# Patient Record
Sex: FEMALE | Race: WHITE | NOT HISPANIC OR LATINO | Employment: FULL TIME | URBAN - METROPOLITAN AREA
[De-identification: names, ages, dates, MRNs, and addresses within clinical notes are randomized per-mention and may not be internally consistent; named-entity substitution may affect disease eponyms.]

---

## 2017-06-20 ENCOUNTER — APPOINTMENT (OUTPATIENT)
Dept: WOMENS IMAGING | Facility: HOSPITAL | Age: 47
End: 2017-06-20

## 2017-06-20 PROCEDURE — G0202 SCR MAMMO BI INCL CAD: HCPCS | Performed by: RADIOLOGY

## 2017-06-20 PROCEDURE — 77067 SCR MAMMO BI INCL CAD: CPT | Performed by: RADIOLOGY

## 2018-06-21 ENCOUNTER — APPOINTMENT (OUTPATIENT)
Dept: WOMENS IMAGING | Facility: HOSPITAL | Age: 48
End: 2018-06-21

## 2018-06-21 PROCEDURE — 77063 BREAST TOMOSYNTHESIS BI: CPT | Performed by: RADIOLOGY

## 2018-06-21 PROCEDURE — 77067 SCR MAMMO BI INCL CAD: CPT | Performed by: RADIOLOGY

## 2019-06-26 ENCOUNTER — APPOINTMENT (OUTPATIENT)
Dept: WOMENS IMAGING | Facility: HOSPITAL | Age: 49
End: 2019-06-26

## 2019-06-26 PROCEDURE — 77067 SCR MAMMO BI INCL CAD: CPT | Performed by: RADIOLOGY

## 2019-06-26 PROCEDURE — 77063 BREAST TOMOSYNTHESIS BI: CPT | Performed by: RADIOLOGY

## 2019-08-27 ENCOUNTER — OFFICE VISIT (OUTPATIENT)
Dept: INTERNAL MEDICINE | Facility: CLINIC | Age: 49
End: 2019-08-27

## 2019-08-27 VITALS
TEMPERATURE: 98.7 F | HEART RATE: 74 BPM | SYSTOLIC BLOOD PRESSURE: 126 MMHG | DIASTOLIC BLOOD PRESSURE: 82 MMHG | OXYGEN SATURATION: 97 % | WEIGHT: 180.9 LBS | BODY MASS INDEX: 32.05 KG/M2 | HEIGHT: 63 IN

## 2019-08-27 DIAGNOSIS — Z12.11 COLON CANCER SCREENING: ICD-10-CM

## 2019-08-27 DIAGNOSIS — E66.9 CLASS 1 OBESITY WITHOUT SERIOUS COMORBIDITY WITH BODY MASS INDEX (BMI) OF 32.0 TO 32.9 IN ADULT, UNSPECIFIED OBESITY TYPE: Primary | ICD-10-CM

## 2019-08-27 DIAGNOSIS — Z80.8 FAMILY HISTORY OF MELANOMA: ICD-10-CM

## 2019-08-27 DIAGNOSIS — M67.432 GANGLION CYST OF DORSUM OF LEFT WRIST: ICD-10-CM

## 2019-08-27 PROBLEM — E66.811 CLASS 1 OBESITY WITHOUT SERIOUS COMORBIDITY WITH BODY MASS INDEX (BMI) OF 32.0 TO 32.9 IN ADULT: Status: ACTIVE | Noted: 2019-08-27

## 2019-08-27 PROBLEM — K21.9 GASTROESOPHAGEAL REFLUX DISEASE WITHOUT ESOPHAGITIS: Status: ACTIVE | Noted: 2019-08-27

## 2019-08-27 PROBLEM — K29.70 GASTRITIS: Status: ACTIVE | Noted: 2019-08-27

## 2019-08-27 PROCEDURE — 99203 OFFICE O/P NEW LOW 30 MIN: CPT | Performed by: NURSE PRACTITIONER

## 2019-08-27 NOTE — PROGRESS NOTES
Subjective   Carmen Matthews is a 48 y.o. female.     History of Present Illness   The patient is here today for check up and establish care. She feels her health is good. Would like to lose weight. She exercises every day. Cardio, weights, boot camp, piyo.   Has been years since she saw Dr. Oliveira.     She reports she is cold at night.     Notes her BP was a little elevated at GYN.    GERD- uses PPIs intermittently     G-2 P-2  Hysterectomy- due to fibroids, still has ovaries    Cyst of left hand, has had ganglion cyst removed previously, same spot, non tender.   The following portions of the patient's history were reviewed and updated as appropriate: allergies, current medications, past family history, past medical history, past social history, past surgical history and problem list.    Review of Systems   Constitutional: Negative for chills and fever.   Respiratory: Negative.    Cardiovascular: Negative.    Psychiatric/Behavioral: Negative for dysphoric mood and suicidal ideas. The patient is not nervous/anxious.        Objective   Physical Exam   Constitutional: She appears well-developed and well-nourished.   Neck: Normal range of motion. Neck supple. No thyromegaly present.   Cardiovascular: Normal rate, regular rhythm, normal heart sounds and intact distal pulses.   Pulmonary/Chest: Effort normal and breath sounds normal.   Lymphadenopathy:     She has no cervical adenopathy.   Skin: Skin is warm and dry.        Ganglion cyst present left inner wrist    Psychiatric: She has a normal mood and affect. Her behavior is normal. Judgment and thought content normal.       Assessment/Plan   There are no diagnoses linked to this encounter.             1. Obesity- work on watching diet, suggest tracking calories, continue to exercise  2. Ganglion cyst- watch, if needed will place referral to hand spec  3. Family history of melanoma- refer to derm, wear sunscreen    c-scope- referral placed   EKG- next year with physical    shingrix- discussed  Check fasting labs and will call results, recommend annual exam

## 2019-09-05 PROBLEM — E78.5 HYPERLIPIDEMIA: Status: ACTIVE | Noted: 2019-09-05

## 2019-09-05 LAB
25(OH)D3+25(OH)D2 SERPL-MCNC: 37.4 NG/ML (ref 30–100)
ALBUMIN SERPL-MCNC: 4.6 G/DL (ref 3.5–5.2)
ALBUMIN/GLOB SERPL: 2 G/DL
ALP SERPL-CCNC: 56 U/L (ref 39–117)
ALT SERPL-CCNC: 14 U/L (ref 1–33)
AST SERPL-CCNC: 13 U/L (ref 1–32)
BASOPHILS # BLD AUTO: 0.04 10*3/MM3 (ref 0–0.2)
BASOPHILS NFR BLD AUTO: 0.7 % (ref 0–1.5)
BILIRUB SERPL-MCNC: 0.6 MG/DL (ref 0.2–1.2)
BUN SERPL-MCNC: 12 MG/DL (ref 6–20)
BUN/CREAT SERPL: 15.2 (ref 7–25)
CALCIUM SERPL-MCNC: 9.3 MG/DL (ref 8.6–10.5)
CHLORIDE SERPL-SCNC: 100 MMOL/L (ref 98–107)
CHOLEST SERPL-MCNC: 227 MG/DL (ref 0–200)
CO2 SERPL-SCNC: 30.3 MMOL/L (ref 22–29)
CREAT SERPL-MCNC: 0.79 MG/DL (ref 0.57–1)
EOSINOPHIL # BLD AUTO: 0.1 10*3/MM3 (ref 0–0.4)
EOSINOPHIL NFR BLD AUTO: 1.8 % (ref 0.3–6.2)
ERYTHROCYTE [DISTWIDTH] IN BLOOD BY AUTOMATED COUNT: 13.3 % (ref 12.3–15.4)
GLOBULIN SER CALC-MCNC: 2.3 GM/DL
GLUCOSE SERPL-MCNC: 98 MG/DL (ref 65–99)
HCT VFR BLD AUTO: 44 % (ref 34–46.6)
HDLC SERPL-MCNC: 64 MG/DL (ref 40–60)
HGB BLD-MCNC: 13.9 G/DL (ref 12–15.9)
IMM GRANULOCYTES # BLD AUTO: 0.01 10*3/MM3 (ref 0–0.05)
IMM GRANULOCYTES NFR BLD AUTO: 0.2 % (ref 0–0.5)
LDLC SERPL CALC-MCNC: 137 MG/DL (ref 0–100)
LDLC/HDLC SERPL: 2.14 {RATIO}
LYMPHOCYTES # BLD AUTO: 1.7 10*3/MM3 (ref 0.7–3.1)
LYMPHOCYTES NFR BLD AUTO: 30.7 % (ref 19.6–45.3)
MCH RBC QN AUTO: 30.2 PG (ref 26.6–33)
MCHC RBC AUTO-ENTMCNC: 31.6 G/DL (ref 31.5–35.7)
MCV RBC AUTO: 95.7 FL (ref 79–97)
MONOCYTES # BLD AUTO: 0.39 10*3/MM3 (ref 0.1–0.9)
MONOCYTES NFR BLD AUTO: 7 % (ref 5–12)
NEUTROPHILS # BLD AUTO: 3.3 10*3/MM3 (ref 1.7–7)
NEUTROPHILS NFR BLD AUTO: 59.6 % (ref 42.7–76)
NRBC BLD AUTO-RTO: 0 /100 WBC (ref 0–0.2)
PLATELET # BLD AUTO: 292 10*3/MM3 (ref 140–450)
POTASSIUM SERPL-SCNC: 4.1 MMOL/L (ref 3.5–5.2)
PROT SERPL-MCNC: 6.9 G/DL (ref 6–8.5)
RBC # BLD AUTO: 4.6 10*6/MM3 (ref 3.77–5.28)
SODIUM SERPL-SCNC: 141 MMOL/L (ref 136–145)
TRIGL SERPL-MCNC: 131 MG/DL (ref 0–150)
TSH SERPL DL<=0.005 MIU/L-ACNC: 2.33 UIU/ML (ref 0.27–4.2)
VLDLC SERPL CALC-MCNC: 26.2 MG/DL
WBC # BLD AUTO: 5.54 10*3/MM3 (ref 3.4–10.8)

## 2019-09-29 ENCOUNTER — PREP FOR SURGERY (OUTPATIENT)
Dept: OTHER | Facility: HOSPITAL | Age: 49
End: 2019-09-29

## 2019-09-29 DIAGNOSIS — Z12.11 SCREEN FOR COLON CANCER: Primary | ICD-10-CM

## 2019-10-02 PROBLEM — Z12.11 SCREEN FOR COLON CANCER: Status: ACTIVE | Noted: 2019-10-02

## 2019-11-15 ENCOUNTER — ANESTHESIA EVENT (OUTPATIENT)
Dept: GASTROENTEROLOGY | Facility: HOSPITAL | Age: 49
End: 2019-11-15

## 2019-11-15 ENCOUNTER — HOSPITAL ENCOUNTER (OUTPATIENT)
Facility: HOSPITAL | Age: 49
Setting detail: HOSPITAL OUTPATIENT SURGERY
Discharge: HOME | End: 2019-11-15
Attending: INTERNAL MEDICINE | Admitting: INTERNAL MEDICINE

## 2019-11-15 ENCOUNTER — ANESTHESIA (OUTPATIENT)
Dept: GASTROENTEROLOGY | Facility: HOSPITAL | Age: 49
End: 2019-11-15

## 2019-11-15 VITALS
BODY MASS INDEX: 31.75 KG/M2 | DIASTOLIC BLOOD PRESSURE: 66 MMHG | HEIGHT: 63 IN | RESPIRATION RATE: 16 BRPM | HEART RATE: 79 BPM | SYSTOLIC BLOOD PRESSURE: 133 MMHG | WEIGHT: 179.2 LBS | TEMPERATURE: 98.3 F | OXYGEN SATURATION: 95 %

## 2019-11-15 DIAGNOSIS — Z12.11 SCREEN FOR COLON CANCER: ICD-10-CM

## 2019-11-15 PROCEDURE — 25010000002 PROPOFOL 10 MG/ML EMULSION: Performed by: ANESTHESIOLOGY

## 2019-11-15 PROCEDURE — 45380 COLONOSCOPY AND BIOPSY: CPT | Performed by: INTERNAL MEDICINE

## 2019-11-15 PROCEDURE — 88305 TISSUE EXAM BY PATHOLOGIST: CPT | Performed by: INTERNAL MEDICINE

## 2019-11-15 PROCEDURE — S0260 H&P FOR SURGERY: HCPCS | Performed by: INTERNAL MEDICINE

## 2019-11-15 RX ORDER — LIDOCAINE HYDROCHLORIDE 10 MG/ML
0.5 INJECTION, SOLUTION INFILTRATION; PERINEURAL ONCE AS NEEDED
Status: DISCONTINUED | OUTPATIENT
Start: 2019-11-15 | End: 2019-11-15 | Stop reason: HOSPADM

## 2019-11-15 RX ORDER — SODIUM CHLORIDE 0.9 % (FLUSH) 0.9 %
10 SYRINGE (ML) INJECTION AS NEEDED
Status: DISCONTINUED | OUTPATIENT
Start: 2019-11-15 | End: 2019-11-15 | Stop reason: HOSPADM

## 2019-11-15 RX ORDER — LIDOCAINE HYDROCHLORIDE 20 MG/ML
INJECTION, SOLUTION INFILTRATION; PERINEURAL AS NEEDED
Status: DISCONTINUED | OUTPATIENT
Start: 2019-11-15 | End: 2019-11-15 | Stop reason: SURG

## 2019-11-15 RX ORDER — PROPOFOL 10 MG/ML
VIAL (ML) INTRAVENOUS AS NEEDED
Status: DISCONTINUED | OUTPATIENT
Start: 2019-11-15 | End: 2019-11-15 | Stop reason: SURG

## 2019-11-15 RX ORDER — SODIUM CHLORIDE, SODIUM LACTATE, POTASSIUM CHLORIDE, CALCIUM CHLORIDE 600; 310; 30; 20 MG/100ML; MG/100ML; MG/100ML; MG/100ML
1000 INJECTION, SOLUTION INTRAVENOUS CONTINUOUS
Status: DISCONTINUED | OUTPATIENT
Start: 2019-11-15 | End: 2019-11-15 | Stop reason: HOSPADM

## 2019-11-15 RX ORDER — PROPOFOL 10 MG/ML
VIAL (ML) INTRAVENOUS CONTINUOUS PRN
Status: DISCONTINUED | OUTPATIENT
Start: 2019-11-15 | End: 2019-11-15 | Stop reason: SURG

## 2019-11-15 RX ADMIN — PROPOFOL 100 MG: 10 INJECTION, EMULSION INTRAVENOUS at 14:12

## 2019-11-15 RX ADMIN — LIDOCAINE HYDROCHLORIDE 60 MG: 20 INJECTION, SOLUTION INFILTRATION; PERINEURAL at 14:12

## 2019-11-15 RX ADMIN — PROPOFOL 50 MG: 10 INJECTION, EMULSION INTRAVENOUS at 14:14

## 2019-11-15 RX ADMIN — PROPOFOL 150 MCG/KG/MIN: 10 INJECTION, EMULSION INTRAVENOUS at 14:12

## 2019-11-15 RX ADMIN — SODIUM CHLORIDE, POTASSIUM CHLORIDE, SODIUM LACTATE AND CALCIUM CHLORIDE 1000 ML: 600; 310; 30; 20 INJECTION, SOLUTION INTRAVENOUS at 14:04

## 2019-11-15 NOTE — ANESTHESIA PREPROCEDURE EVALUATION
Anesthesia Evaluation     Patient summary reviewed and Nursing notes reviewed   no history of anesthetic complications:  NPO Solid Status: > 8 hours  NPO Liquid Status: > 4 hours           Airway   Mallampati: III  Dental      Pulmonary - negative pulmonary ROS and normal exam   Cardiovascular - normal exam    (+) hyperlipidemia,       Neuro/Psych- negative ROS  GI/Hepatic/Renal/Endo    (+) obesity,  GERD,      Musculoskeletal     Abdominal    Substance History      OB/GYN          Other                        Anesthesia Plan    ASA 2     MAC     intravenous induction     Anesthetic plan, all risks, benefits, and alternatives have been provided, discussed and informed consent has been obtained with: patient.

## 2019-11-15 NOTE — ANESTHESIA POSTPROCEDURE EVALUATION
"Patient: Carmen Matthews    Procedure Summary     Date:  11/15/19 Room / Location:   [facility]    Anesthesia Start:  1407 Anesthesia Stop:  1434    Procedure:  COLONOSCOPY with polypectomy (cold bx) (N/A ) Diagnosis:       Screen for colon cancer      (Screen for colon cancer [Z12.11])    Surgeon:  Ming Samano MD Provider:  Arthur Braxton MD    Anesthesia Type:  MAC ASA Status:  2          Anesthesia Type: MAC  Last vitals  BP   127/64 (11/15/19 1431)   Temp   36.8 °C (98.3 °F) (11/15/19 1354)   Pulse   72 (11/15/19 1431)   Resp   14 (11/15/19 1431)     SpO2   95 % (11/15/19 1431)     Post Anesthesia Care and Evaluation    Patient location during evaluation: bedside  Patient participation: complete - patient participated  Level of consciousness: awake and alert  Pain management: adequate  Airway patency: patent  Anesthetic complications: No anesthetic complications    Cardiovascular status: acceptable  Respiratory status: acceptable  Hydration status: acceptable    Comments: /64 (BP Location: Left arm, Patient Position: Lying)   Pulse 72   Temp 36.8 °C (98.3 °F) (Oral)   Resp 14   Ht 160 cm (63\")   Wt 81.3 kg (179 lb 3.2 oz)   SpO2 95%   BMI 31.74 kg/m²       "

## 2019-11-15 NOTE — H&P
"[facility]  Pre Procedure History & Physical    Chief Complaint:   Time for my screening colonoscopy    Subjective     HPI:   48 y.o. female who is here for a screening colonoscopy. Her brother had colon cancer.     Past Medical History:   Past Medical History:   Diagnosis Date   • Gastritis        Family History:  Family History   Problem Relation Age of Onset   • Hypertension Mother    • Arthritis Mother    • Melanoma Father    • Colon cancer Brother    • No Known Problems Maternal Grandmother    • No Known Problems Maternal Grandfather    • No Known Problems Paternal Grandmother    • Cancer Paternal Grandfather    • No Known Problems Son    • No Known Problems Son        Social History:   reports that she has never smoked. She has never used smokeless tobacco. She reports that she drinks alcohol. She reports that she uses drugs.    Medications:   No medications prior to admission.       Allergies:  Patient has no known allergies.    ROS:    Pertinent items are noted in HPI     Objective     Blood pressure 116/94, pulse 77, temperature 98.3 °F (36.8 °C), temperature source Oral, resp. rate 16, height 160 cm (63\"), weight 81.3 kg (179 lb 3.2 oz), SpO2 96 %.    Physical Exam   Constitutional: Pt is oriented to person, place, and time and well-developed, well-nourished, and in no distress.   HENT:   Mouth/Throat: Oropharynx is clear and moist.   Neck: Normal range of motion. Neck supple.   Cardiovascular: Normal rate, regular rhythm and normal heart sounds.    Pulmonary/Chest: Effort normal and breath sounds normal. No respiratory distress. No  wheezes.   Abdominal: Soft. Bowel sounds are normal.   Skin: Skin is warm and dry.   Psychiatric: Mood, memory, affect and judgment normal.     Assessment/Plan     Diagnosis:  48 y.o. female who is here for a screening colonoscopy. Her brother had colon cancer.     Anticipated Surgical Procedure:  Colonoscopy    The risks, benefits, and alternatives of " this procedure have been discussed with the patient or the responsible party- the patient understands and agrees to proceed.

## 2019-11-18 PROBLEM — D12.6 ADENOMATOUS POLYP OF COLON: Status: ACTIVE | Noted: 2019-11-18

## 2019-11-18 LAB
CYTO UR: NORMAL
LAB AP CASE REPORT: NORMAL
PATH REPORT.FINAL DX SPEC: NORMAL
PATH REPORT.GROSS SPEC: NORMAL

## 2019-11-24 NOTE — PROGRESS NOTES
11/24/19  Tell her that the colon polyps that were removed were not cancerous but some were precancerous. I recommend that she have a repeat colonoscopy in 3 years. FAX to PCP.   Jennifer peguero

## 2019-11-26 ENCOUNTER — TELEPHONE (OUTPATIENT)
Dept: GASTROENTEROLOGY | Facility: CLINIC | Age: 49
End: 2019-11-26

## 2019-11-26 NOTE — TELEPHONE ENCOUNTER
Called pt and left  for pt to call back.       C/s placed in recall for 11/15/2022 and path results sent to Dr Jeter thru [facility].

## 2019-11-26 NOTE — TELEPHONE ENCOUNTER
Call to pt.  Advise per Dr Samano that colon polyps that were removed were not cancerous but some were precancerous.  Recommend repeat c/s in 3 yrs.  Verb understanding.

## 2019-11-26 NOTE — TELEPHONE ENCOUNTER
----- Message from Ming Samano MD sent at 11/24/2019  4:01 PM EST -----  11/24/19  Tell her that the colon polyps that were removed were not cancerous but some were precancerous. I recommend that she have a repeat colonoscopy in 3 years. FAX to PCP.   Raheem. kj

## 2020-07-14 ENCOUNTER — APPOINTMENT (OUTPATIENT)
Dept: WOMENS IMAGING | Facility: HOSPITAL | Age: 50
End: 2020-07-14

## 2020-07-14 PROCEDURE — 77063 BREAST TOMOSYNTHESIS BI: CPT | Performed by: RADIOLOGY

## 2020-07-14 PROCEDURE — 77067 SCR MAMMO BI INCL CAD: CPT | Performed by: RADIOLOGY

## 2020-08-20 ENCOUNTER — TELEPHONE (OUTPATIENT)
Dept: INTERNAL MEDICINE | Facility: CLINIC | Age: 50
End: 2020-08-20

## 2020-08-20 DIAGNOSIS — Z00.00 HEALTHCARE MAINTENANCE: Primary | ICD-10-CM

## 2020-08-20 DIAGNOSIS — E78.5 HYPERLIPIDEMIA, UNSPECIFIED HYPERLIPIDEMIA TYPE: ICD-10-CM

## 2020-08-25 LAB
25(OH)D3+25(OH)D2 SERPL-MCNC: 32.4 NG/ML (ref 30–100)
ALBUMIN SERPL-MCNC: 4.3 G/DL (ref 3.8–4.9)
ALBUMIN/GLOB SERPL: 2.2 {RATIO} (ref 1.2–2.2)
ALP SERPL-CCNC: 52 IU/L (ref 39–117)
ALT SERPL-CCNC: 12 IU/L (ref 0–32)
AST SERPL-CCNC: 18 IU/L (ref 0–40)
BASOPHILS # BLD AUTO: 0.1 X10E3/UL (ref 0–0.2)
BASOPHILS NFR BLD AUTO: 1 %
BILIRUB SERPL-MCNC: 0.5 MG/DL (ref 0–1.2)
BUN SERPL-MCNC: 19 MG/DL (ref 6–24)
BUN/CREAT SERPL: 22 (ref 9–23)
CALCIUM SERPL-MCNC: 9.3 MG/DL (ref 8.7–10.2)
CHLORIDE SERPL-SCNC: 101 MMOL/L (ref 96–106)
CHOLEST SERPL-MCNC: 189 MG/DL (ref 100–199)
CHOLEST/HDLC SERPL: 3.9 RATIO (ref 0–4.4)
CO2 SERPL-SCNC: 26 MMOL/L (ref 20–29)
CREAT SERPL-MCNC: 0.86 MG/DL (ref 0.57–1)
EOSINOPHIL # BLD AUTO: 0.1 X10E3/UL (ref 0–0.4)
EOSINOPHIL NFR BLD AUTO: 3 %
ERYTHROCYTE [DISTWIDTH] IN BLOOD BY AUTOMATED COUNT: 13.1 % (ref 11.7–15.4)
GLOBULIN SER CALC-MCNC: 2 G/DL (ref 1.5–4.5)
GLUCOSE SERPL-MCNC: 96 MG/DL (ref 65–99)
HCT VFR BLD AUTO: 40.9 % (ref 34–46.6)
HDLC SERPL-MCNC: 49 MG/DL
HGB BLD-MCNC: 13.4 G/DL (ref 11.1–15.9)
IMM GRANULOCYTES # BLD AUTO: 0 X10E3/UL (ref 0–0.1)
IMM GRANULOCYTES NFR BLD AUTO: 0 %
LDLC SERPL CALC-MCNC: 127 MG/DL (ref 0–99)
LYMPHOCYTES # BLD AUTO: 1.8 X10E3/UL (ref 0.7–3.1)
LYMPHOCYTES NFR BLD AUTO: 39 %
MCH RBC QN AUTO: 29.7 PG (ref 26.6–33)
MCHC RBC AUTO-ENTMCNC: 32.8 G/DL (ref 31.5–35.7)
MCV RBC AUTO: 91 FL (ref 79–97)
MONOCYTES # BLD AUTO: 0.4 X10E3/UL (ref 0.1–0.9)
MONOCYTES NFR BLD AUTO: 9 %
NEUTROPHILS # BLD AUTO: 2.2 X10E3/UL (ref 1.4–7)
NEUTROPHILS NFR BLD AUTO: 48 %
PLATELET # BLD AUTO: 253 X10E3/UL (ref 150–450)
POTASSIUM SERPL-SCNC: 4.6 MMOL/L (ref 3.5–5.2)
PROT SERPL-MCNC: 6.3 G/DL (ref 6–8.5)
RBC # BLD AUTO: 4.51 X10E6/UL (ref 3.77–5.28)
SODIUM SERPL-SCNC: 141 MMOL/L (ref 134–144)
TRIGL SERPL-MCNC: 64 MG/DL (ref 0–149)
TSH SERPL DL<=0.005 MIU/L-ACNC: 2.09 UIU/ML (ref 0.45–4.5)
VLDLC SERPL CALC-MCNC: 13 MG/DL (ref 5–40)
WBC # BLD AUTO: 4.5 X10E3/UL (ref 3.4–10.8)

## 2020-08-31 ENCOUNTER — OFFICE VISIT (OUTPATIENT)
Dept: INTERNAL MEDICINE | Facility: CLINIC | Age: 50
End: 2020-08-31

## 2020-08-31 VITALS
OXYGEN SATURATION: 99 % | HEART RATE: 62 BPM | TEMPERATURE: 97.5 F | SYSTOLIC BLOOD PRESSURE: 122 MMHG | DIASTOLIC BLOOD PRESSURE: 82 MMHG | HEIGHT: 63 IN | WEIGHT: 166.4 LBS | BODY MASS INDEX: 29.48 KG/M2

## 2020-08-31 DIAGNOSIS — E78.5 HYPERLIPIDEMIA, UNSPECIFIED HYPERLIPIDEMIA TYPE: ICD-10-CM

## 2020-08-31 DIAGNOSIS — Z00.00 HEALTH CARE MAINTENANCE: Primary | ICD-10-CM

## 2020-08-31 PROCEDURE — 99396 PREV VISIT EST AGE 40-64: CPT | Performed by: NURSE PRACTITIONER

## 2020-08-31 PROCEDURE — 93000 ELECTROCARDIOGRAM COMPLETE: CPT | Performed by: NURSE PRACTITIONER

## 2021-07-15 ENCOUNTER — APPOINTMENT (OUTPATIENT)
Dept: WOMENS IMAGING | Facility: HOSPITAL | Age: 51
End: 2021-07-15

## 2021-07-15 PROCEDURE — 77063 BREAST TOMOSYNTHESIS BI: CPT | Performed by: RADIOLOGY

## 2021-07-15 PROCEDURE — 77067 SCR MAMMO BI INCL CAD: CPT | Performed by: RADIOLOGY

## 2021-09-01 ENCOUNTER — OFFICE VISIT (OUTPATIENT)
Dept: INTERNAL MEDICINE | Facility: CLINIC | Age: 51
End: 2021-09-01

## 2021-09-01 VITALS
HEIGHT: 63 IN | TEMPERATURE: 97.7 F | DIASTOLIC BLOOD PRESSURE: 82 MMHG | SYSTOLIC BLOOD PRESSURE: 122 MMHG | BODY MASS INDEX: 26.58 KG/M2 | HEART RATE: 67 BPM | OXYGEN SATURATION: 98 % | WEIGHT: 150 LBS

## 2021-09-01 DIAGNOSIS — Z00.00 HEALTH CARE MAINTENANCE: ICD-10-CM

## 2021-09-01 DIAGNOSIS — Z82.49 FAMILY HISTORY OF CARDIAC DISORDER IN FATHER: ICD-10-CM

## 2021-09-01 DIAGNOSIS — E78.5 HYPERLIPIDEMIA, UNSPECIFIED HYPERLIPIDEMIA TYPE: ICD-10-CM

## 2021-09-01 DIAGNOSIS — Z82.49 FAMILY HISTORY OF CARDIOMYOPATHY: Primary | ICD-10-CM

## 2021-09-01 PROCEDURE — 99396 PREV VISIT EST AGE 40-64: CPT | Performed by: NURSE PRACTITIONER

## 2021-09-01 NOTE — PROGRESS NOTES
Subjective   Carmen Matthews is a 50 y.o. female.     History of Present Illness   The patient is here today for CPE and lab work F/U. Down 16 lbs from last year, 13 lbs the year prior. She exercises 6 days a week different cardio and strength training.     The 10-year ASCVD risk score (Kevin SALCIDO Jr., et al., 2013) is: 1.2%    Values used to calculate the score:      Age: 50 years      Sex: Female      Is Non- : No      Diabetic: No      Tobacco smoker: No      Systolic Blood Pressure: 122 mmHg      Is BP treated: No      HDL Cholesterol: 75 mg/dL      Total Cholesterol: 238 mg/dL    The following portions of the patient's history were reviewed and updated as appropriate: allergies, current medications, past family history, past medical history, past social history, past surgical history and problem list.    Review of Systems   Constitutional: Negative for chills, fatigue and fever.   HENT: Negative for ear pain, rhinorrhea and sore throat.    Eyes: Negative.    Respiratory: Negative for cough and shortness of breath.    Cardiovascular: Negative.    Gastrointestinal: Negative.    Endocrine: Negative for cold intolerance and heat intolerance.   Genitourinary: Negative for breast discharge, breast lump, breast pain, difficulty urinating, dyspareunia, dysuria, flank pain, frequency, genital sores, hematuria, pelvic pain and urgency.   Musculoskeletal: Negative.    Skin: Negative.    Allergic/Immunologic: Negative for environmental allergies and food allergies.   Neurological: Negative.    Hematological: Negative.    Psychiatric/Behavioral: Negative for dysphoric mood and suicidal ideas. The patient is not nervous/anxious.        Objective   Physical Exam  Constitutional:       Appearance: Normal appearance. She is well-developed.      Comments: Body mass index is 26.57 kg/m².     HENT:      Right Ear: Hearing, tympanic membrane, ear canal and external ear normal.      Left Ear: Hearing, ear canal  and external ear normal. A middle ear effusion is present.   Eyes:      General: Lids are normal.      Conjunctiva/sclera: Conjunctivae normal.      Pupils: Pupils are equal, round, and reactive to light.   Neck:      Thyroid: No thyroid mass or thyromegaly.      Vascular: No carotid bruit.      Trachea: Trachea normal.   Cardiovascular:      Rate and Rhythm: Regular rhythm. Bradycardia present.      Pulses: Normal pulses.      Heart sounds: Normal heart sounds.   Pulmonary:      Effort: Pulmonary effort is normal. No retractions.      Breath sounds: Normal breath sounds.   Chest:      Chest wall: No mass, lacerations, deformity, swelling, tenderness, crepitus or edema.      Breasts: Breasts are symmetrical.         Right: No inverted nipple, mass, nipple discharge, skin change or tenderness.         Left: No inverted nipple, mass, nipple discharge, skin change or tenderness.      Comments: Thick breast tissue lateral upper sides of both breasts  Abdominal:      General: Bowel sounds are normal.      Palpations: Abdomen is soft.      Tenderness: There is no abdominal tenderness.   Musculoskeletal:         General: Normal range of motion.      Cervical back: Normal range of motion.   Lymphadenopathy:      Cervical: No cervical adenopathy.      Upper Body:      Right upper body: No supraclavicular adenopathy.      Left upper body: No supraclavicular adenopathy.      Lower Body: No right inguinal adenopathy. No left inguinal adenopathy.   Skin:     General: Skin is warm and dry.   Neurological:      Mental Status: She is alert and oriented to person, place, and time.      GCS: GCS eye subscore is 4. GCS verbal subscore is 5. GCS motor subscore is 6.      Cranial Nerves: No cranial nerve deficit.      Sensory: No sensory deficit.      Deep Tendon Reflexes:      Reflex Scores:       Patellar reflexes are 2+ on the right side and 2+ on the left side.  Psychiatric:         Speech: Speech normal.         Behavior: Behavior  normal. Behavior is cooperative.         Thought Content: Thought content normal.         Judgment: Judgment normal.         Vitals:    09/01/21 1008   BP: 122/82   Pulse: 67   Temp: 97.7 °F (36.5 °C)   SpO2: 98%     Body mass index is 26.57 kg/m².      Assessment/Plan   Diagnoses and all orders for this visit:    1. Family history of cardiomyopathy (Primary)  -     Ambulatory Referral to Cardiology  -     CBC & Differential; Future  -     Comprehensive Metabolic Panel; Future  -     Lipid Panel With LDL / HDL Ratio; Future  -     TSH Rfx On Abnormal To Free T4; Future  -     Vitamin D 25 Hydroxy; Future    2. Family history of cardiac disorder in father  -     Ambulatory Referral to Cardiology  -     CBC & Differential; Future  -     Comprehensive Metabolic Panel; Future  -     Lipid Panel With LDL / HDL Ratio; Future  -     TSH Rfx On Abnormal To Free T4; Future  -     Vitamin D 25 Hydroxy; Future    3. Health care maintenance  -     CBC & Differential; Future  -     Comprehensive Metabolic Panel; Future  -     Lipid Panel With LDL / HDL Ratio; Future  -     TSH Rfx On Abnormal To Free T4; Future  -     Vitamin D 25 Hydroxy; Future    4. Hyperlipidemia, unspecified hyperlipidemia type  -     CBC & Differential; Future  -     Comprehensive Metabolic Panel; Future  -     Lipid Panel With LDL / HDL Ratio; Future  -     TSH Rfx On Abnormal To Free T4; Future  -     Vitamin D 25 Hydroxy; Future                 1. Preventative counseling- continue healthy nutrition and exercise  2. Family hx of cardiomyopathy- unsure if genetic or not, will refer to cards  3. HPL- limit cholesterol containing foods, mediterranean diet guide, continue to exercise    Pt reports recent mammo, neg  Derm- UTD  Hx partial hyst

## 2021-09-01 NOTE — PATIENT INSTRUCTIONS
Mediterranean Diet  A Mediterranean diet refers to food and lifestyle choices that are based on the traditions of countries located on the [place]. This way of eating has been shown to help prevent certain conditions and improve outcomes for people who have chronic diseases, like kidney disease and heart disease.  What are tips for following this plan?  Lifestyle  · Cook and eat meals together with your family, when possible.  · Drink enough fluid to keep your urine clear or pale yellow.  · Be physically active every day. This includes:  ? Aerobic exercise like running or swimming.  ? Leisure activities like gardening, walking, or housework.  · Get 7-8 hours of sleep each night.  · If recommended by your health care provider, drink red wine in moderation. This means 1 glass a day for nonpregnant women and 2 glasses a day for men. A glass of wine equals 5 oz (150 mL).  Reading food labels    · Check the serving size of packaged foods. For foods such as rice and pasta, the serving size refers to the amount of cooked product, not dry.  · Check the total fat in packaged foods. Avoid foods that have saturated fat or trans fats.  · Check the ingredients list for added sugars, such as corn syrup.  Shopping  · At the grocery store, buy most of your food from the areas near the walls of the store. This includes:  ? Fresh fruits and vegetables (produce).  ? Grains, beans, nuts, and seeds. Some of these may be available in unpackaged forms or large amounts (in bulk).  ? Fresh seafood.  ? Poultry and eggs.  ? Low-fat dairy products.  · Buy whole ingredients instead of prepackaged foods.  · Buy fresh fruits and vegetables in-season from local farmers markets.  · Buy frozen fruits and vegetables in resealable bags.  · If you do not have access to quality fresh seafood, buy precooked frozen shrimp or canned fish, such as tuna, salmon, or sardines.  · Buy small amounts of raw or cooked vegetables, salads, or olives from  the deli or salad bar at your store.  · Stock your pantry so you always have certain foods on hand, such as olive oil, canned tuna, canned tomatoes, rice, pasta, and beans.  Cooking  · Cook foods with extra-virgin olive oil instead of using butter or other vegetable oils.  · Have meat as a side dish, and have vegetables or grains as your main dish. This means having meat in small portions or adding small amounts of meat to foods like pasta or stew.  · Use beans or vegetables instead of meat in common dishes like chili or lasagna.  · [place] with different cooking methods. Try roasting or broiling vegetables instead of steaming or sautéeing them.  · Add frozen vegetables to soups, stews, pasta, or rice.  · Add nuts or seeds for added healthy fat at each meal. You can add these to yogurt, salads, or vegetable dishes.  · Marinate fish or vegetables using olive oil, lemon juice, garlic, and fresh herbs.  Meal planning    · Plan to eat 1 vegetarian meal one day each week. Try to work up to 2 vegetarian meals, if possible.  · Eat seafood 2 or more times a week.  · Have healthy snacks readily available, such as:  ? Vegetable sticks with hummus.  ? Greek yogurt.  ? Fruit and nut trail mix.  · Eat balanced meals throughout the week. This includes:  ? Fruit: 2-3 servings a day  ? Vegetables: 4-5 servings a day  ? Low-fat dairy: 2 servings a day  ? Fish, poultry, or lean meat: 1 serving a day  ? Beans and legumes: 2 or more servings a week  ? Nuts and seeds: 1-2 servings a day  ? Whole grains: 6-8 servings a day  ? Extra-virgin olive oil: 3-4 servings a day  · Limit red meat and sweets to only a few servings a month  What are my food choices?  · Mediterranean diet  ? Recommended  § Grains: Whole-grain pasta. Brown rice. Bulgar wheat. Polenta. Couscous. Whole-wheat bread. Oatmeal. Quinoa.  § Vegetables: Artichokes. Beets. Broccoli. Cabbage. Carrots. Eggplant. Green beans. Chard. Kale. Spinach. Onions. Leeks. Peas. Squash.  Tomatoes. Peppers. Radishes.  § Fruits: Apples. Apricots. Avocado. Berries. Bananas. Cherries. Dates. Figs. Grapes. Stephani. Melon. Oranges. Peaches. Plums. Pomegranate.  § Meats and other protein foods: Beans. Almonds. Sunflower seeds. Pine nuts. Peanuts. Cod. [place]. Scallops. Shrimp. Tuna. Tilapia. Clams. Oysters. Eggs.  § Dairy: Low-fat milk. Cheese. Greek yogurt.  § Beverages: Water. Red wine. Herbal tea.  § Fats and oils: Extra virgin olive oil. Avocado oil. Grape seed oil.  § Sweets and desserts: Greek yogurt with honey. Baked apples. Poached pears. Trail mix.  § Seasoning and other foods: Basil. Cilantro. Coriander. Cumin. Mint. Parsley. Terrance. Rosemary. Tarragon. Garlic. Oregano. Thyme. Pepper. Balsalmic vinegar. Tahini. Hummus. Tomato sauce. Olives. Mushrooms.  ? Limit these  § Grains: Prepackaged pasta or rice dishes. Prepackaged cereal with added sugar.  § Vegetables: Deep fried potatoes (french fries).  § Fruits: Fruit canned in syrup.  § Meats and other protein foods: Beef. Pork. Lamb. Poultry with skin. Hot dogs. Jasso.  § Dairy: Ice cream. Sour cream. Whole milk.  § Beverages: Juice. Sugar-sweetened soft drinks. Beer. Liquor and spirits.  § Fats and oils: Butter. Canola oil. Vegetable oil. Beef fat (tallow). Lard.  § Sweets and desserts: Cookies. Cakes. Pies. Candy.  § Seasoning and other foods: Mayonnaise. Premade sauces and marinades.  The items listed may not be a complete list. Talk with your dietitian about what dietary choices are right for you.  Summary  · The Mediterranean diet includes both food and lifestyle choices.  · Eat a variety of fresh fruits and vegetables, beans, nuts, seeds, and whole grains.  · Limit the amount of red meat and sweets that you eat.  · Talk with your health care provider about whether it is safe for you to drink red wine in moderation. This means 1 glass a day for nonpregnant women and 2 glasses a day for men. A glass of wine equals 5 oz (150 mL).  This information  is not intended to replace advice given to you by your health care provider. Make sure you discuss any questions you have with your health care provider.  Document Revised: 08/17/2017 Document Reviewed: 08/10/2017  Elsevier Patient Education © 2020 [employer]

## 2021-09-20 ENCOUNTER — OFFICE VISIT (OUTPATIENT)
Dept: CARDIOLOGY | Facility: CLINIC | Age: 51
End: 2021-09-20

## 2021-09-20 VITALS
BODY MASS INDEX: 27.11 KG/M2 | HEART RATE: 67 BPM | SYSTOLIC BLOOD PRESSURE: 122 MMHG | WEIGHT: 153 LBS | DIASTOLIC BLOOD PRESSURE: 82 MMHG | HEIGHT: 63 IN

## 2021-09-20 DIAGNOSIS — Z82.49 FAMILY HISTORY OF CARDIOMYOPATHY: Primary | ICD-10-CM

## 2021-09-20 PROCEDURE — 99203 OFFICE O/P NEW LOW 30 MIN: CPT | Performed by: INTERNAL MEDICINE

## 2021-09-20 PROCEDURE — 93000 ELECTROCARDIOGRAM COMPLETE: CPT | Performed by: INTERNAL MEDICINE

## 2021-09-20 NOTE — PROGRESS NOTES
Subjective:     Encounter Date:21      Patient ID: Carmen Matthews is a 50 y.o. female.    Chief Complaint:  History of Present Illness    Dear Jojo RODRIGUEZ    I had the pleasure of seeing this patient in the office today for initial evaluation and consultation.  I appreciate that you sent him in to see us.  They come in today to be seen for eval for potential cardiomyopathy.    Patient has no known cardiac history.  Her brother  of cardiomyopathy.  He had a nonischemic dilated cardiomyopathy.  It was felt most likely to be secondary to treatment for prior cancer, although they were unable to exclude the possibility of a viral cardiomyopathy.  He had cardiac catheterization that showed no significant coronary disease, echocardiogram that did not demonstrate hypertrophic cardiomyopathy, and a cardiac MRI that showed nonischemic cardiomyopathy but no other tissue pathology.  An AICD been recommended, but he was a , held off on getting that, and then he  suddenly.    She denies any cardiac complaints.  She denies any chest pain, pressure, tightness, squeezing, or heartburn.  She has not experienced any feeling of palpitations, tachycardia or heart racing and no presyncope or syncope.  There have not been any problems with dizziness or lightheadedness.  There have not been any orthopnea or PND, and no problems with lower extremity edema.  She denies any shortness of breath at rest or with activity and has not had any wheezing.  She has not had any problems with unexplained nausea or vomiting. She has continued to perform daily activities of living without any specific problem or change in the level of activity.  She has not been recently hospitalized for any reason.    The following portions of the patient's history were reviewed and updated as appropriate: allergies, current medications, past family history, past medical history, past social history, past surgical history and problem  "list.      ECG 12 Lead    Date/Time: 9/20/2021 12:52 PM  Performed by: Edin Zendejas III, MD  Authorized by: Edin Zendejas III, MD   Comparison: compared with previous ECG   Similar to previous ECG  Rhythm: sinus rhythm  Rate: normal  Conduction: conduction normal  ST Segments: ST segments normal  T Waves: T waves normal  QRS axis: normal  Other: no other findings    Clinical impression: normal ECG               Objective:     Vitals:    09/20/21 1231   BP: 122/82   Pulse: 67   Weight: 69.4 kg (153 lb)   Height: 160 cm (63\")     Body mass index is 27.1 kg/m².      Vitals reviewed.   Constitutional:       General: Not in acute distress.     Appearance: Well-developed. Not diaphoretic.   Eyes:      General:         Right eye: No discharge.         Left eye: No discharge.      Conjunctiva/sclera: Conjunctivae normal.      Pupils: Pupils are equal, round, and reactive to light.   HENT:      Head: Normocephalic and atraumatic.      Nose: Nose normal.   Neck:      Thyroid: No thyromegaly.      Trachea: No tracheal deviation.      Lymphadenopathy: No cervical adenopathy.   Pulmonary:      Effort: Pulmonary effort is normal. No respiratory distress.      Breath sounds: Normal breath sounds. No stridor.   Chest:      Chest wall: Not tender to palpatation.   Cardiovascular:      Normal rate. Regular rhythm.      Murmurs: There is no murmur.      . No S3 gallop. No click. No rub.   Pulses:     Intact distal pulses.   Abdominal:      General: Bowel sounds are normal. There is no distension.      Palpations: Abdomen is soft. There is no abdominal mass.      Tenderness: There is no abdominal tenderness. There is no guarding or rebound.   Musculoskeletal: Normal range of motion.         General: No tenderness or deformity.      Cervical back: Normal range of motion and neck supple. Skin:     General: Skin is warm and dry.      Findings: No erythema or rash.   Neurological:      Mental Status: Alert and oriented to person, place, " and time.      Deep Tendon Reflexes: Reflexes are normal and symmetric.   Psychiatric:         Thought Content: Thought content normal.         Data and records reviewed:     Lab Results   Component Value Date    BUN 15 08/25/2021    CREATININE 0.73 08/25/2021    EGFRIFNONA 84 08/25/2021    EGFRIFAFRI 101 08/25/2021    BCR 20.5 08/25/2021    K 4.1 08/25/2021    CO2 28.4 08/25/2021    CALCIUM 9.4 08/25/2021    PROTENTOTREF 6.4 08/25/2021    ALBUMIN 4.60 08/25/2021    LABIL2 2.6 08/25/2021    AST 19 08/25/2021    ALT 15 08/25/2021     No results found for: CHOL  Lab Results   Component Value Date    TRIG 52 08/25/2021    TRIG 64 08/24/2020    TRIG 131 09/04/2019     Lab Results   Component Value Date    HDL 75 (H) 08/25/2021    HDL 49 08/24/2020    HDL 64 (H) 09/04/2019     Lab Results   Component Value Date     (H) 08/25/2021     (H) 08/24/2020     (H) 09/04/2019     Lab Results   Component Value Date    VLDL 8 08/25/2021    VLDL 13 08/24/2020    VLDL 26.2 09/04/2019     Lab Results   Component Value Date    LDLHDL 2.03 08/25/2021    LDLHDL 2.14 09/04/2019     CBC    CBC 8/25/21   WBC 4.27   RBC 4.47   Hemoglobin 13.1   Hematocrit 40.1   MCV 89.7   MCH 29.3   MCHC 32.7   RDW 13.3   Platelets 292           No radiology results for the last 90 days.          Assessment:          Diagnosis Plan   1. Family history of cardiomyopathy  Adult Transthoracic Echo Complete W/ Cont if Necessary Per Protocol    ECG 12 Lead          Plan:       1.  Family history of cardiomyopathy in her brother, sounds to be a nonischemic cardiomyopathy with etiology is prior oncology therapy, although they were unable to exclude a prior viral cardiomyopathy.  Unlikely that this has any familial basis.  We will obtain an echocardiogram per guideline recommendations.  If no abnormality seen, she would not require any further investigation.    Thank you very much for allowing us to participate in the care of this pleasant  patient.  Please don't hesitate to call if I can be of assistance in any way.    No current outpatient medications on file.         No follow-ups on file.

## 2021-10-21 ENCOUNTER — HOSPITAL ENCOUNTER (OUTPATIENT)
Dept: CARDIOLOGY | Facility: HOSPITAL | Age: 51
Discharge: HOME | End: 2021-10-21
Admitting: INTERNAL MEDICINE

## 2021-10-21 DIAGNOSIS — Z82.49 FAMILY HISTORY OF CARDIOMYOPATHY: ICD-10-CM

## 2021-10-21 LAB
AORTIC ARCH: 2.2 CM
ASCENDING AORTA: 2.9 CM
BH CV ECHO MEAS - ACS: 2.1 CM
BH CV ECHO MEAS - AO MAX PG (FULL): 1.7 MMHG
BH CV ECHO MEAS - AO MAX PG: 6.7 MMHG
BH CV ECHO MEAS - AO MEAN PG (FULL): 1 MMHG
BH CV ECHO MEAS - AO MEAN PG: 3 MMHG
BH CV ECHO MEAS - AO ROOT AREA (BSA CORRECTED): 1.6
BH CV ECHO MEAS - AO ROOT AREA: 5.7 CM^2
BH CV ECHO MEAS - AO ROOT DIAM: 2.7 CM
BH CV ECHO MEAS - AO V2 MAX: 129 CM/SEC
BH CV ECHO MEAS - AO V2 MEAN: 84.5 CM/SEC
BH CV ECHO MEAS - AO V2 VTI: 26.5 CM
BH CV ECHO MEAS - ASC AORTA: 2.9 CM
BH CV ECHO MEAS - AVA(I,A): 3 CM^2
BH CV ECHO MEAS - AVA(I,D): 3 CM^2
BH CV ECHO MEAS - AVA(V,A): 3 CM^2
BH CV ECHO MEAS - AVA(V,D): 3 CM^2
BH CV ECHO MEAS - BSA(HAYCOCK): 1.8 M^2
BH CV ECHO MEAS - BSA: 1.7 M^2
BH CV ECHO MEAS - BZI_BMI: 27.1 KILOGRAMS/M^2
BH CV ECHO MEAS - BZI_METRIC_HEIGHT: 160 CM
BH CV ECHO MEAS - BZI_METRIC_WEIGHT: 69.4 KG
BH CV ECHO MEAS - EDV(CUBED): 117.6 ML
BH CV ECHO MEAS - EDV(MOD-SP2): 76 ML
BH CV ECHO MEAS - EDV(MOD-SP4): 83 ML
BH CV ECHO MEAS - EDV(TEICH): 112.8 ML
BH CV ECHO MEAS - EF(CUBED): 66.6 %
BH CV ECHO MEAS - EF(MOD-BP): 57.8 %
BH CV ECHO MEAS - EF(MOD-SP2): 60.5 %
BH CV ECHO MEAS - EF(MOD-SP4): 56.6 %
BH CV ECHO MEAS - EF(TEICH): 58 %
BH CV ECHO MEAS - ESV(CUBED): 39.3 ML
BH CV ECHO MEAS - ESV(MOD-SP2): 30 ML
BH CV ECHO MEAS - ESV(MOD-SP4): 36 ML
BH CV ECHO MEAS - ESV(TEICH): 47.4 ML
BH CV ECHO MEAS - FS: 30.6 %
BH CV ECHO MEAS - IVS/LVPW: 0.8
BH CV ECHO MEAS - IVSD: 0.8 CM
BH CV ECHO MEAS - LAT PEAK E' VEL: 12.4 CM/SEC
BH CV ECHO MEAS - LV DIASTOLIC VOL/BSA (35-75): 48.1 ML/M^2
BH CV ECHO MEAS - LV MASS(C)D: 153 GRAMS
BH CV ECHO MEAS - LV MASS(C)DI: 88.6 GRAMS/M^2
BH CV ECHO MEAS - LV MAX PG: 4.9 MMHG
BH CV ECHO MEAS - LV MEAN PG: 2 MMHG
BH CV ECHO MEAS - LV SYSTOLIC VOL/BSA (12-30): 20.9 ML/M^2
BH CV ECHO MEAS - LV V1 MAX: 111 CM/SEC
BH CV ECHO MEAS - LV V1 MEAN: 70.8 CM/SEC
BH CV ECHO MEAS - LV V1 VTI: 23.1 CM
BH CV ECHO MEAS - LVIDD: 4.9 CM
BH CV ECHO MEAS - LVIDS: 3.4 CM
BH CV ECHO MEAS - LVLD AP2: 7.4 CM
BH CV ECHO MEAS - LVLD AP4: 7.4 CM
BH CV ECHO MEAS - LVLS AP2: 6 CM
BH CV ECHO MEAS - LVLS AP4: 6.5 CM
BH CV ECHO MEAS - LVOT AREA (M): 3.5 CM^2
BH CV ECHO MEAS - LVOT AREA: 3.5 CM^2
BH CV ECHO MEAS - LVOT DIAM: 2.1 CM
BH CV ECHO MEAS - LVPWD: 1 CM
BH CV ECHO MEAS - MED PEAK E' VEL: 7.6 CM/SEC
BH CV ECHO MEAS - MR MAX PG: 19.2 MMHG
BH CV ECHO MEAS - MR MAX VEL: 219 CM/SEC
BH CV ECHO MEAS - MV A DUR: 0.11 SEC
BH CV ECHO MEAS - MV A MAX VEL: 66.5 CM/SEC
BH CV ECHO MEAS - MV DEC SLOPE: 528 CM/SEC^2
BH CV ECHO MEAS - MV DEC TIME: 0.14 SEC
BH CV ECHO MEAS - MV E MAX VEL: 99.1 CM/SEC
BH CV ECHO MEAS - MV E/A: 1.5
BH CV ECHO MEAS - MV MAX PG: 4.2 MMHG
BH CV ECHO MEAS - MV MEAN PG: 2 MMHG
BH CV ECHO MEAS - MV P1/2T MAX VEL: 105 CM/SEC
BH CV ECHO MEAS - MV P1/2T: 58.2 MSEC
BH CV ECHO MEAS - MV V2 MAX: 103 CM/SEC
BH CV ECHO MEAS - MV V2 MEAN: 67.4 CM/SEC
BH CV ECHO MEAS - MV V2 VTI: 28.8 CM
BH CV ECHO MEAS - MVA P1/2T LCG: 2.1 CM^2
BH CV ECHO MEAS - MVA(P1/2T): 3.8 CM^2
BH CV ECHO MEAS - MVA(VTI): 2.8 CM^2
BH CV ECHO MEAS - PA ACC TIME: 0.2 SEC
BH CV ECHO MEAS - PA MAX PG (FULL): 1.7 MMHG
BH CV ECHO MEAS - PA MAX PG: 2.8 MMHG
BH CV ECHO MEAS - PA PR(ACCEL): -8.8 MMHG
BH CV ECHO MEAS - PA V2 MAX: 84.2 CM/SEC
BH CV ECHO MEAS - PULM A REVS DUR: 0.13 SEC
BH CV ECHO MEAS - PULM A REVS VEL: 24.1 CM/SEC
BH CV ECHO MEAS - PULM DIAS VEL: 47.7 CM/SEC
BH CV ECHO MEAS - PULM S/D: 1.5
BH CV ECHO MEAS - PULM SYS VEL: 71.9 CM/SEC
BH CV ECHO MEAS - RAP SYSTOLE: 3 MMHG
BH CV ECHO MEAS - RV MAX PG: 1.1 MMHG
BH CV ECHO MEAS - RV MEAN PG: 1 MMHG
BH CV ECHO MEAS - RV V1 MAX: 52.9 CM/SEC
BH CV ECHO MEAS - RV V1 MEAN: 35.6 CM/SEC
BH CV ECHO MEAS - RV V1 VTI: 11.6 CM
BH CV ECHO MEAS - RVSP: 20 MMHG
BH CV ECHO MEAS - SI(AO): 87.9 ML/M^2
BH CV ECHO MEAS - SI(CUBED): 45.4 ML/M^2
BH CV ECHO MEAS - SI(LVOT): 46.4 ML/M^2
BH CV ECHO MEAS - SI(MOD-SP2): 26.7 ML/M^2
BH CV ECHO MEAS - SI(MOD-SP4): 27.2 ML/M^2
BH CV ECHO MEAS - SI(TEICH): 37.9 ML/M^2
BH CV ECHO MEAS - SUP REN AO DIAM: 1.8 CM
BH CV ECHO MEAS - SV(AO): 151.7 ML
BH CV ECHO MEAS - SV(CUBED): 78.3 ML
BH CV ECHO MEAS - SV(LVOT): 80 ML
BH CV ECHO MEAS - SV(MOD-SP2): 46 ML
BH CV ECHO MEAS - SV(MOD-SP4): 47 ML
BH CV ECHO MEAS - SV(TEICH): 65.4 ML
BH CV ECHO MEAS - TAPSE (>1.6): 2.3 CM
BH CV ECHO MEAS - TR MAX VEL: 206 CM/SEC
BH CV ECHO MEASUREMENTS AVERAGE E/E' RATIO: 9.91
BH CV XLRA - RV BASE: 2.9 CM
BH CV XLRA - RV LENGTH: 6 CM
BH CV XLRA - RV MID: 2 CM
BH CV XLRA - TDI S': 10.7 CM/SEC
LEFT ATRIUM VOLUME INDEX: 30 ML/M2
MAXIMAL PREDICTED HEART RATE: 168 BPM
SINUS: 2.7 CM
STJ: 2.4 CM
STRESS TARGET HR: 143 BPM

## 2021-10-21 PROCEDURE — 93306 TTE W/DOPPLER COMPLETE: CPT

## 2021-10-21 PROCEDURE — 93356 MYOCRD STRAIN IMG SPCKL TRCK: CPT

## 2021-10-21 PROCEDURE — 93306 TTE W/DOPPLER COMPLETE: CPT | Performed by: INTERNAL MEDICINE

## 2021-10-21 PROCEDURE — 93356 MYOCRD STRAIN IMG SPCKL TRCK: CPT | Performed by: INTERNAL MEDICINE

## 2021-10-21 NOTE — PROGRESS NOTES
Her echo looks good.  She has normal function with no signs of cardiomyopathy.  She does have a trace of mitral valve regurgitation which is normal.  No further testing is needed.  Can see on a as needed basis.

## 2022-07-20 ENCOUNTER — APPOINTMENT (OUTPATIENT)
Dept: WOMENS IMAGING | Facility: HOSPITAL | Age: 52
End: 2022-07-20

## 2022-07-20 PROCEDURE — 77063 BREAST TOMOSYNTHESIS BI: CPT | Performed by: RADIOLOGY

## 2022-07-20 PROCEDURE — 77067 SCR MAMMO BI INCL CAD: CPT | Performed by: RADIOLOGY

## 2022-09-29 ENCOUNTER — OFFICE VISIT (OUTPATIENT)
Dept: INTERNAL MEDICINE | Facility: CLINIC | Age: 52
End: 2022-09-29

## 2022-09-29 VITALS
SYSTOLIC BLOOD PRESSURE: 128 MMHG | TEMPERATURE: 97.7 F | OXYGEN SATURATION: 99 % | HEART RATE: 66 BPM | BODY MASS INDEX: 27.94 KG/M2 | HEIGHT: 63 IN | WEIGHT: 157.7 LBS | DIASTOLIC BLOOD PRESSURE: 78 MMHG

## 2022-09-29 DIAGNOSIS — E78.5 HYPERLIPIDEMIA, UNSPECIFIED HYPERLIPIDEMIA TYPE: ICD-10-CM

## 2022-09-29 DIAGNOSIS — Z80.8 FAMILY HISTORY OF MELANOMA: ICD-10-CM

## 2022-09-29 DIAGNOSIS — Z00.00 HEALTH CARE MAINTENANCE: Primary | ICD-10-CM

## 2022-09-29 PROCEDURE — 90686 IIV4 VACC NO PRSV 0.5 ML IM: CPT | Performed by: NURSE PRACTITIONER

## 2022-09-29 PROCEDURE — 90471 IMMUNIZATION ADMIN: CPT | Performed by: NURSE PRACTITIONER

## 2022-09-29 PROCEDURE — 99396 PREV VISIT EST AGE 40-64: CPT | Performed by: NURSE PRACTITIONER

## 2022-09-29 NOTE — PROGRESS NOTES
Subjective   Carmen Matthews is a 51 y.o. female.     History of Present Illness   The patient is here today for CPE and lab work F/U. She is feeling well. Still working out 5-6 days a week.     Son is Alexy Matthews. He is our patient, does construction with a friend.   Son Anthony just had a baby girl and has a step daughter.   Mom is 80 yrs old. [place] all [place] the same [place].     Family hx of cardiomyopathy- ECHO 9/2021, EF 57.8% WNL  The following portions of the patient's history were reviewed and updated as appropriate: allergies, current medications, past family history, past medical history, past social history, past surgical history and problem list.    Review of Systems   Constitutional: Negative for chills, fatigue and fever.   HENT: Negative for ear pain, rhinorrhea and sore throat.    Eyes: Negative.    Respiratory: Negative for cough and shortness of breath.    Cardiovascular: Negative.    Gastrointestinal: Negative.    Endocrine: Positive for cold intolerance. Negative for heat intolerance.   Genitourinary: Negative for breast discharge, breast lump, breast pain, difficulty urinating, dyspareunia, dysuria, flank pain, frequency, genital sores, hematuria, pelvic pain and urgency.   Musculoskeletal: Positive for arthralgias (hands in am).   Skin: Negative.    Allergic/Immunologic: Negative for environmental allergies and food allergies.   Neurological: Negative.    Hematological: Negative.    Psychiatric/Behavioral: Negative for dysphoric mood and suicidal ideas. The patient is not nervous/anxious.        Objective   Physical Exam  Constitutional:       Appearance: Normal appearance. She is well-developed.      Comments: Body mass index is 27.94 kg/m².     HENT:      Right Ear: Hearing, tympanic membrane, ear canal and external ear normal.      Left Ear: Hearing, tympanic membrane, ear canal and external ear normal.      Nose: Nose normal.      Mouth/Throat:      Pharynx: Uvula midline.   Eyes:       General: Lids are normal.      Conjunctiva/sclera: Conjunctivae normal.      Pupils: Pupils are equal, round, and reactive to light.   Neck:      Thyroid: No thyroid mass or thyromegaly.      Vascular: No carotid bruit.      Trachea: Trachea normal.   Cardiovascular:      Rate and Rhythm: Normal rate and regular rhythm.      Pulses: Normal pulses.      Heart sounds: Normal heart sounds.   Pulmonary:      Effort: Pulmonary effort is normal. No retractions.      Breath sounds: Normal breath sounds.   Chest:      Chest wall: No mass, lacerations, deformity, swelling, tenderness, crepitus or edema.   Breasts: Breasts are symmetrical.      Right: No swelling, bleeding, inverted nipple, mass, nipple discharge, skin change, tenderness or supraclavicular adenopathy.      Left: No swelling, bleeding, inverted nipple, mass, nipple discharge, skin change, tenderness or supraclavicular adenopathy.       Abdominal:      General: Bowel sounds are normal.      Palpations: Abdomen is soft.      Tenderness: There is no abdominal tenderness.   Musculoskeletal:         General: Normal range of motion.      Cervical back: Normal range of motion.   Lymphadenopathy:      Cervical: No cervical adenopathy.      Upper Body:      Right upper body: No supraclavicular adenopathy.      Left upper body: No supraclavicular adenopathy.      Lower Body: No right inguinal adenopathy. No left inguinal adenopathy.   Skin:     General: Skin is warm and dry.   Neurological:      Mental Status: She is alert and oriented to person, place, and time.      GCS: GCS eye subscore is 4. GCS verbal subscore is 5. GCS motor subscore is 6.      Cranial Nerves: No cranial nerve deficit.      Sensory: No sensory deficit.      Deep Tendon Reflexes:      Reflex Scores:       Patellar reflexes are 2+ on the right side and 2+ on the left side.  Psychiatric:         Speech: Speech normal.         Behavior: Behavior normal. Behavior is cooperative.         Thought  Content: Thought content normal.         Judgment: Judgment normal.         Vitals:    09/29/22 1043   BP: 128/78   Pulse: 66   Temp: 97.7 °F (36.5 °C)   SpO2: 99%     Body mass index is 27.94 kg/m².    Results Encounter on 08/01/2022   Component Date Value Ref Range Status   • WBC 08/30/2022 5.24  3.40 - 10.80 10*3/mm3 Final   • RBC 08/30/2022 4.24  3.77 - 5.28 10*6/mm3 Final   • Hemoglobin 08/30/2022 12.9  12.0 - 15.9 g/dL Final   • Hematocrit 08/30/2022 38.5  34.0 - 46.6 % Final   • MCV 08/30/2022 90.8  79.0 - 97.0 fL Final   • MCH 08/30/2022 30.4  26.6 - 33.0 pg Final   • MCHC 08/30/2022 33.5  31.5 - 35.7 g/dL Final   • RDW 08/30/2022 13.3  12.3 - 15.4 % Final   • Platelets 08/30/2022 278  140 - 450 10*3/mm3 Final   • Neutrophil Rel % 08/30/2022 53.0  42.7 - 76.0 % Final   • Lymphocyte Rel % 08/30/2022 36.1  19.6 - 45.3 % Final   • Monocyte Rel % 08/30/2022 7.1  5.0 - 12.0 % Final   • Eosinophil Rel % 08/30/2022 2.5  0.3 - 6.2 % Final   • Basophil Rel % 08/30/2022 1.1  0.0 - 1.5 % Final   • Neutrophils Absolute 08/30/2022 2.78  1.70 - 7.00 10*3/mm3 Final   • Lymphocytes Absolute 08/30/2022 1.89  0.70 - 3.10 10*3/mm3 Final   • Monocytes Absolute 08/30/2022 0.37  0.10 - 0.90 10*3/mm3 Final   • Eosinophils Absolute 08/30/2022 0.13  0.00 - 0.40 10*3/mm3 Final   • Basophils Absolute 08/30/2022 0.06  0.00 - 0.20 10*3/mm3 Final   • Immature Granulocyte Rel % 08/30/2022 0.2  0.0 - 0.5 % Final   • Immature Grans Absolute 08/30/2022 0.01  0.00 - 0.05 10*3/mm3 Final   • nRBC 08/30/2022 0.0  0.0 - 0.2 /100 WBC Final   • Glucose 08/30/2022 90  65 - 99 mg/dL Final   • BUN 08/30/2022 14  6 - 20 mg/dL Final   • Creatinine 08/30/2022 0.73  0.57 - 1.00 mg/dL Final   • EGFR Result 08/30/2022 98.5  >60.0 mL/min/1.73 Final    Comment: National Kidney Foundation and American Society of  Nephrology (ASN) Task Force recommended calculation based  on the Chronic Kidney Disease Epidemiology Collaboration  (CKD-EPI) equation refit without  adjustment for race.  GFR Normal >60  Chronic Kidney Disease <60  Kidney Failure <15     • BUN/Creatinine Ratio 08/30/2022 19.2  7.0 - 25.0 Final   • Sodium 08/30/2022 141  136 - 145 mmol/L Final   • Potassium 08/30/2022 4.1  3.5 - 5.2 mmol/L Final   • Chloride 08/30/2022 104  98 - 107 mmol/L Final   • Total CO2 08/30/2022 29.0  22.0 - 29.0 mmol/L Final   • Calcium 08/30/2022 9.2  8.6 - 10.5 mg/dL Final   • Total Protein 08/30/2022 5.8 (A) 6.0 - 8.5 g/dL Final   • Albumin 08/30/2022 4.20  3.50 - 5.20 g/dL Final   • Globulin 08/30/2022 1.6  gm/dL Final   • A/G Ratio 08/30/2022 2.6  g/dL Final   • Total Bilirubin 08/30/2022 0.5  0.0 - 1.2 mg/dL Final   • Alkaline Phosphatase 08/30/2022 56  39 - 117 U/L Final   • AST (SGOT) 08/30/2022 18  1 - 32 U/L Final   • ALT (SGPT) 08/30/2022 13  1 - 33 U/L Final   • Total Cholesterol 08/30/2022 187  0 - 200 mg/dL Final    Comment: Cholesterol Reference Ranges  (U.S. Department of Health and Human Services ATP III  Classifications)  Desirable          <200 mg/dL  Borderline High    200-239 mg/dL  High Risk          >240 mg/dL  Triglyceride Reference Ranges  (U.S. Department of Health and Human Services ATP III  Classifications)  Normal           <150 mg/dL  Borderline High  150-199 mg/dL  High             200-499 mg/dL  Very High        >500 mg/dL  HDL Reference Ranges  (U.S. Department of Health and Human Services ATP III  Classifications)  Low     <40 mg/dl (major risk factor for CHD)  High    >60 mg/dl ('negative' risk factor for CHD)  LDL Reference Ranges  (U.S. Department of Health and Human Services ATP III  Classifications)  Optimal          <100 mg/dL  Near Optimal     100-129 mg/dL  Borderline High  130-159 mg/dL  High             160-189 mg/dL  Very High        >189 mg/dL     • Triglycerides 08/30/2022 118  0 - 150 mg/dL Final   • HDL Cholesterol 08/30/2022 64 (A) 40 - 60 mg/dL Final   • VLDL Cholesterol [place] 08/30/2022 21  5 - 40 mg/dL Final   • LDL Chol Calc ([facility])  08/30/2022 102 (A) 0 - 100 mg/dL Final   • LDL/HDL RATIO 08/30/2022 1.55   Final   • TSH 08/30/2022 1.770  0.270 - 4.200 uIU/mL Final   • 25 Hydroxy, Vitamin D 08/30/2022 39.8  30.0 - 100.0 ng/ml Final    Comment: Results may be falsely increased if patient taking Biotin.  Reference Range for Total Vitamin D 25(OH)  Deficiency <20.0 ng/mL  Insufficiency 21-29 ng/mL  Sufficiency  ng/mL  Toxicity >100 ng/ml       Assessment & Plan   Diagnoses and all orders for this visit:    1. Health care maintenance (Primary)    2. Family history of melanoma    3. Hyperlipidemia, unspecified hyperlipidemia type                 1. Preventative counseling- continue to work on healthy diet and exercise  2. Fam hx of melanoma- UTD with derm, wearing sunscreen  3. HPL- much improved, continue same    Will request mammo record  Reminded due for C-scope soon

## 2022-11-17 ENCOUNTER — PRE-PROCEDURE SCREENING (OUTPATIENT)
Dept: GASTROENTEROLOGY | Facility: CLINIC | Age: 52
End: 2022-11-17

## 2022-11-17 NOTE — TELEPHONE ENCOUNTER
Last scope 11/19 in epic --Personal history of polyps--Family history of polyps and colon cancer--No blood thinners--Medications:                  None               Pt verbalized and understood that it would be few weeks before been schedule

## 2022-11-28 ENCOUNTER — PREP FOR SURGERY (OUTPATIENT)
Dept: OTHER | Facility: HOSPITAL | Age: 52
End: 2022-11-28

## 2022-11-28 DIAGNOSIS — Z80.0 FAMILY HISTORY OF COLON CANCER: ICD-10-CM

## 2022-11-28 DIAGNOSIS — K63.5 COLON POLYP: Primary | ICD-10-CM

## 2023-01-19 ENCOUNTER — TELEPHONE (OUTPATIENT)
Dept: GASTROENTEROLOGY | Facility: CLINIC | Age: 53
End: 2023-01-19

## 2023-01-19 NOTE — TELEPHONE ENCOUNTER
Hub staff attempted to follow warm transfer process and was unsuccessful     Caller: Carmen Matthews    Relationship to patient: Self    Best call back number: [phone]     Patient is needing: PATIENT STATED SHE HAD COMPLETED HER QUESTIONNAIRE IN NOVEMBER OF 2022 AND WAS CHECKING ON WHEN SHE MAY BE CONTACTED TO SCHEDULE HER COLONOSCOPY. PATIENT IS REQUESTING A CALL BACK TO ASSIST WITH SCHEDULING.

## 2023-01-20 ENCOUNTER — TELEPHONE (OUTPATIENT)
Dept: GASTROENTEROLOGY | Facility: CLINIC | Age: 53
End: 2023-01-20
Payer: COMMERCIAL

## 2023-01-20 PROBLEM — K63.5 COLON POLYP: Status: ACTIVE | Noted: 2023-01-20

## 2023-01-20 PROBLEM — Z80.0 FAMILY HISTORY OF COLON CANCER: Status: ACTIVE | Noted: 2023-01-20

## 2023-01-20 NOTE — TELEPHONE ENCOUNTER
GÓMEZ patient via telephone for COLONOSCOPY. Scheduled 03/24/2023 with arrival time of 0200PM. Prep paperwork mailed to verified address on file. Patient advised arrival time may change based on [facility] guidelines. GÓMEZ COOL

## 2023-03-24 ENCOUNTER — ANESTHESIA (OUTPATIENT)
Dept: GASTROENTEROLOGY | Facility: HOSPITAL | Age: 53
End: 2023-03-24
Payer: COMMERCIAL

## 2023-03-24 ENCOUNTER — HOSPITAL ENCOUNTER (OUTPATIENT)
Facility: HOSPITAL | Age: 53
Setting detail: HOSPITAL OUTPATIENT SURGERY
Discharge: HOME | End: 2023-03-24
Attending: INTERNAL MEDICINE | Admitting: INTERNAL MEDICINE
Payer: COMMERCIAL

## 2023-03-24 ENCOUNTER — ANESTHESIA EVENT (OUTPATIENT)
Dept: GASTROENTEROLOGY | Facility: HOSPITAL | Age: 53
End: 2023-03-24
Payer: COMMERCIAL

## 2023-03-24 VITALS
DIASTOLIC BLOOD PRESSURE: 72 MMHG | HEIGHT: 64 IN | OXYGEN SATURATION: 99 % | RESPIRATION RATE: 20 BRPM | BODY MASS INDEX: 27.45 KG/M2 | SYSTOLIC BLOOD PRESSURE: 125 MMHG | WEIGHT: 160.8 LBS | HEART RATE: 58 BPM

## 2023-03-24 DIAGNOSIS — Z80.0 FAMILY HISTORY OF COLON CANCER: ICD-10-CM

## 2023-03-24 DIAGNOSIS — K63.5 COLON POLYP: ICD-10-CM

## 2023-03-24 PROCEDURE — 88305 TISSUE EXAM BY PATHOLOGIST: CPT | Performed by: INTERNAL MEDICINE

## 2023-03-24 PROCEDURE — 45380 COLONOSCOPY AND BIOPSY: CPT | Performed by: INTERNAL MEDICINE

## 2023-03-24 PROCEDURE — 25010000002 PROPOFOL 10 MG/ML EMULSION: Performed by: NURSE ANESTHETIST, CERTIFIED REGISTERED

## 2023-03-24 RX ORDER — PROPOFOL 10 MG/ML
VIAL (ML) INTRAVENOUS CONTINUOUS PRN
Status: DISCONTINUED | OUTPATIENT
Start: 2023-03-24 | End: 2023-03-24 | Stop reason: SURG

## 2023-03-24 RX ORDER — SODIUM CHLORIDE 0.9 % (FLUSH) 0.9 %
10 SYRINGE (ML) INJECTION EVERY 12 HOURS SCHEDULED
Status: DISCONTINUED | OUTPATIENT
Start: 2023-03-24 | End: 2023-03-24 | Stop reason: HOSPADM

## 2023-03-24 RX ORDER — SODIUM CHLORIDE 9 MG/ML
40 INJECTION, SOLUTION INTRAVENOUS AS NEEDED
Status: DISCONTINUED | OUTPATIENT
Start: 2023-03-24 | End: 2023-03-24 | Stop reason: HOSPADM

## 2023-03-24 RX ORDER — SODIUM CHLORIDE, SODIUM LACTATE, POTASSIUM CHLORIDE, CALCIUM CHLORIDE 600; 310; 30; 20 MG/100ML; MG/100ML; MG/100ML; MG/100ML
30 INJECTION, SOLUTION INTRAVENOUS CONTINUOUS PRN
Status: DISCONTINUED | OUTPATIENT
Start: 2023-03-24 | End: 2023-03-24 | Stop reason: HOSPADM

## 2023-03-24 RX ORDER — SODIUM CHLORIDE 0.9 % (FLUSH) 0.9 %
10 SYRINGE (ML) INJECTION AS NEEDED
Status: DISCONTINUED | OUTPATIENT
Start: 2023-03-24 | End: 2023-03-24 | Stop reason: HOSPADM

## 2023-03-24 RX ORDER — LIDOCAINE HYDROCHLORIDE 20 MG/ML
INJECTION, SOLUTION INFILTRATION; PERINEURAL AS NEEDED
Status: DISCONTINUED | OUTPATIENT
Start: 2023-03-24 | End: 2023-03-24 | Stop reason: SURG

## 2023-03-24 RX ADMIN — PROPOFOL 125 MCG/KG/MIN: 10 INJECTION, EMULSION INTRAVENOUS at 15:38

## 2023-03-24 RX ADMIN — LIDOCAINE HYDROCHLORIDE 100 MG: 20 INJECTION, SOLUTION INFILTRATION; PERINEURAL at 15:35

## 2023-03-24 RX ADMIN — SODIUM CHLORIDE, POTASSIUM CHLORIDE, SODIUM LACTATE AND CALCIUM CHLORIDE 30 ML/HR: 600; 310; 30; 20 INJECTION, SOLUTION INTRAVENOUS at 14:37

## 2023-03-24 NOTE — ANESTHESIA POSTPROCEDURE EVALUATION
"Patient: Carmen Matthews    Procedure Summary     Date: 03/24/23 Room / Location: [facility] ENDOSCOPY 6 /  [facility]    Anesthesia Start: 1531 Anesthesia Stop: 1556    Procedure: COLONOSCOPY to cecum into terminal ileum with polypectomy (bx) Diagnosis:       Colon polyp      Family history of colon cancer      (Colon polyp [K63.5])      (Family history of colon cancer [Z80.0])    Surgeons: Ming Samano MD Provider: Zion Cordova MD    Anesthesia Type: MAC ASA Status: 2          Anesthesia Type: MAC    Vitals  Vitals Value Taken Time   /72 03/24/23 1613   Temp     Pulse 58 03/24/23 1613   Resp 20 03/24/23 1613   SpO2 99 % 03/24/23 1613           Post Anesthesia Care and Evaluation    Patient location during evaluation: bedside  Patient participation: complete - patient participated  Level of consciousness: awake and alert  Pain management: adequate    Airway patency: patent  Anesthetic complications: No anesthetic complications  PONV Status: controlled  Cardiovascular status: acceptable  Respiratory status: acceptable  Hydration status: acceptable    Comments: /72 (BP Location: Left arm, Patient Position: Lying)   Pulse 58   Resp 20   Ht 162.6 cm (64\")   Wt 72.9 kg (160 lb 12.8 oz)   SpO2 99%   BMI 27.60 kg/m²         "

## 2023-03-24 NOTE — H&P
"[facility]  Pre Procedure History & Physical    Chief Complaint:   Time for my colonoscopy    Subjective     HPI:   52 y.o. female who has a h/o colon polyps. Her brother had colon cancer. She last had a colonoscopy in 11/19.     Past Medical History:   Past Medical History:   Diagnosis Date   • Gastritis    • History of colon polyps        Family History:  Family History   Problem Relation Age of Onset   • Hypertension Mother    • Arthritis Mother    • Atrial fibrillation Mother    • Anxiety disorder Mother    • Melanoma Father    • Heart disease Father 67        triple bypass   • Colon cancer Brother    • Cardiomyopathy Brother    • Heart failure Brother 56   • Sudden death Brother    • No Known Problems Son    • No Known Problems Son    • Dementia Paternal Grandmother    • Cancer Paternal Grandfather    • Malig Hyperthermia Neg Hx        Social History:   reports that she has never smoked. She has never used smokeless tobacco. She reports that she does not currently use alcohol. She reports that she does not use drugs.    Medications:   No medications prior to admission.       Allergies:  Patient has no known allergies.    ROS:    Pertinent items are noted in HPI     Objective     Blood pressure 119/59, pulse 68, resp. rate 20, height 162.6 cm (64\"), weight 72.9 kg (160 lb 12.8 oz), SpO2 100 %.    Physical Exam   Constitutional: Pt is oriented to person, place, and time and well-developed, well-nourished, and in no distress.   HENT:   Mouth/Throat: Oropharynx is clear and moist.   Neck: Normal range of motion. Neck supple.   Cardiovascular: Normal rate, regular rhythm and normal heart sounds.    Pulmonary/Chest: Effort normal and breath sounds normal. No respiratory distress. No  wheezes.   Abdominal: Soft. Bowel sounds are normal.   Skin: Skin is warm and dry.   Psychiatric: Mood, memory, affect and judgment normal.     Assessment & Plan     Diagnosis:  52 y.o. female who has a h/o colon " polyps. Her brother had colon cancer. She last had a colonoscopy in 11/19.     Anticipated Surgical Procedure:  Colonoscopy    The risks, benefits, and alternatives of this procedure have been discussed with the patient or the responsible party- the patient understands and agrees to proceed.    Ming Samano M.D.

## 2023-03-24 NOTE — ANESTHESIA PREPROCEDURE EVALUATION
Anesthesia Evaluation     Patient summary reviewed and Nursing notes reviewed   no history of anesthetic complications:  NPO Solid Status: > 8 hours  NPO Liquid Status: > 4 hours           Airway   Mallampati: III  Dental      Pulmonary - negative pulmonary ROS and normal exam   Cardiovascular - normal exam    (+) hyperlipidemia,       Neuro/Psych- negative ROS  GI/Hepatic/Renal/Endo    (+) obesity,  GERD,      Musculoskeletal     Abdominal    Substance History      OB/GYN          Other                          Anesthesia Plan    ASA 2     MAC     intravenous induction     Anesthetic plan, risks, benefits, and alternatives have been provided, discussed and informed consent has been obtained with: patient.

## 2023-03-27 LAB
LAB AP CASE REPORT: NORMAL
PATH REPORT.FINAL DX SPEC: NORMAL
PATH REPORT.GROSS SPEC: NORMAL

## 2023-03-27 NOTE — PROGRESS NOTES
03/27/23       Tell her that the colon polyps that were removed were not cancerous and not precancerous, which is good.  I recommend that she have a repeat colonoscopy in 5 years.  Please send a copy of this report to her PCP.  Jennifer peguero

## 2023-03-28 ENCOUNTER — TELEPHONE (OUTPATIENT)
Dept: GASTROENTEROLOGY | Facility: CLINIC | Age: 53
End: 2023-03-28
Payer: COMMERCIAL

## 2023-03-28 NOTE — TELEPHONE ENCOUNTER
----- Message from Ming Samano MD sent at 3/27/2023  3:27 PM EDT -----  03/27/23       Tell her that the colon polyps that were removed were not cancerous and not precancerous, which is good.  I recommend that she have a repeat colonoscopy in 5 years.  Please send a copy of this report to her PCP.  Thx. kjh

## 2023-03-28 NOTE — TELEPHONE ENCOUNTER
Called pt and left  for pt to call back.     C/s placed in [facility] and  for 03/24/2028.    Results sent to Dr Jeter thru [facility].

## 2023-07-21 ENCOUNTER — APPOINTMENT (OUTPATIENT)
Dept: WOMENS IMAGING | Facility: HOSPITAL | Age: 53
End: 2023-07-21
Payer: COMMERCIAL

## 2023-07-21 PROCEDURE — 77067 SCR MAMMO BI INCL CAD: CPT | Performed by: RADIOLOGY

## 2023-07-21 PROCEDURE — 77063 BREAST TOMOSYNTHESIS BI: CPT | Performed by: RADIOLOGY

## 2023-10-18 ENCOUNTER — LAB (OUTPATIENT)
Dept: INTERNAL MEDICINE | Facility: CLINIC | Age: 53
End: 2023-10-18
Payer: COMMERCIAL

## 2023-10-18 DIAGNOSIS — E78.5 HYPERLIPIDEMIA, UNSPECIFIED HYPERLIPIDEMIA TYPE: Primary | ICD-10-CM

## 2023-10-18 DIAGNOSIS — Z00.00 HEALTH CARE MAINTENANCE: Primary | ICD-10-CM

## 2023-10-19 LAB
25(OH)D3+25(OH)D2 SERPL-MCNC: 32.2 NG/ML (ref 30–100)
ALBUMIN SERPL-MCNC: 4.4 G/DL (ref 3.8–4.9)
ALBUMIN/GLOB SERPL: 2 {RATIO} (ref 1.2–2.2)
ALP SERPL-CCNC: 67 IU/L (ref 44–121)
ALT SERPL-CCNC: 24 IU/L (ref 0–32)
AST SERPL-CCNC: 27 IU/L (ref 0–40)
BASOPHILS # BLD AUTO: 0 X10E3/UL (ref 0–0.2)
BASOPHILS NFR BLD AUTO: 1 %
BILIRUB SERPL-MCNC: 0.8 MG/DL (ref 0–1.2)
BUN SERPL-MCNC: 19 MG/DL (ref 6–24)
BUN/CREAT SERPL: 23 (ref 9–23)
CALCIUM SERPL-MCNC: 9.1 MG/DL (ref 8.7–10.2)
CHLORIDE SERPL-SCNC: 102 MMOL/L (ref 96–106)
CHOLEST SERPL-MCNC: 259 MG/DL (ref 100–199)
CO2 SERPL-SCNC: 23 MMOL/L (ref 20–29)
CREAT SERPL-MCNC: 0.81 MG/DL (ref 0.57–1)
EGFRCR SERPLBLD CKD-EPI 2021: 86 ML/MIN/1.73
EOSINOPHIL # BLD AUTO: 0.1 X10E3/UL (ref 0–0.4)
EOSINOPHIL NFR BLD AUTO: 2 %
ERYTHROCYTE [DISTWIDTH] IN BLOOD BY AUTOMATED COUNT: 13.4 % (ref 11.7–15.4)
GLOBULIN SER CALC-MCNC: 2.2 G/DL (ref 1.5–4.5)
GLUCOSE SERPL-MCNC: 89 MG/DL (ref 70–99)
HCT VFR BLD AUTO: 39.2 % (ref 34–46.6)
HDLC SERPL-MCNC: 72 MG/DL
HGB BLD-MCNC: 13 G/DL (ref 11.1–15.9)
IMM GRANULOCYTES # BLD AUTO: 0 X10E3/UL (ref 0–0.1)
IMM GRANULOCYTES NFR BLD AUTO: 0 %
LDLC SERPL CALC-MCNC: 174 MG/DL (ref 0–99)
LDLC/HDLC SERPL: 2.4 RATIO (ref 0–3.2)
LYMPHOCYTES # BLD AUTO: 1.3 X10E3/UL (ref 0.7–3.1)
LYMPHOCYTES NFR BLD AUTO: 25 %
MCH RBC QN AUTO: 29.9 PG (ref 26.6–33)
MCHC RBC AUTO-ENTMCNC: 33.2 G/DL (ref 31.5–35.7)
MCV RBC AUTO: 90 FL (ref 79–97)
MONOCYTES # BLD AUTO: 0.5 X10E3/UL (ref 0.1–0.9)
MONOCYTES NFR BLD AUTO: 10 %
NEUTROPHILS # BLD AUTO: 3.3 X10E3/UL (ref 1.4–7)
NEUTROPHILS NFR BLD AUTO: 62 %
PLATELET # BLD AUTO: 311 X10E3/UL (ref 150–450)
POTASSIUM SERPL-SCNC: 4.4 MMOL/L (ref 3.5–5.2)
PROT SERPL-MCNC: 6.6 G/DL (ref 6–8.5)
RBC # BLD AUTO: 4.35 X10E6/UL (ref 3.77–5.28)
SODIUM SERPL-SCNC: 139 MMOL/L (ref 134–144)
TRIGL SERPL-MCNC: 79 MG/DL (ref 0–149)
TSH SERPL DL<=0.005 MIU/L-ACNC: 1.87 UIU/ML (ref 0.45–4.5)
VLDLC SERPL CALC-MCNC: 13 MG/DL (ref 5–40)
WBC # BLD AUTO: 5.4 X10E3/UL (ref 3.4–10.8)

## 2023-10-23 ENCOUNTER — OFFICE VISIT (OUTPATIENT)
Dept: INTERNAL MEDICINE | Facility: CLINIC | Age: 53
End: 2023-10-23
Payer: COMMERCIAL

## 2023-10-23 VITALS
SYSTOLIC BLOOD PRESSURE: 122 MMHG | HEIGHT: 64 IN | OXYGEN SATURATION: 98 % | HEART RATE: 68 BPM | WEIGHT: 175.25 LBS | BODY MASS INDEX: 29.92 KG/M2 | DIASTOLIC BLOOD PRESSURE: 80 MMHG

## 2023-10-23 DIAGNOSIS — E78.5 HYPERLIPIDEMIA, UNSPECIFIED HYPERLIPIDEMIA TYPE: ICD-10-CM

## 2023-10-23 DIAGNOSIS — Z00.00 HEALTH CARE MAINTENANCE: Primary | ICD-10-CM

## 2023-10-23 NOTE — PROGRESS NOTES
Subjective   Carmen Matthews is a 52 y.o. female.     History of Present Illness   The patient is here today for CPE and lab work F/U. Up 15 lbs. She is still exercising regularly. She is not eating healthy right now. She spoke with a nutritionist last week.   Care taking a lot for mom.     Noticed left foot fourth toe with cyst, saw derm. Synovial cyst, does not bother her.     BMI is >= 30 and <35. (Class 1 Obesity). The following options were offered after discussion;: exercise counseling/recommendations and nutrition counseling/recommendations     Son is Alexy Matthews. He is our patient, does construction with a friend. His dtr is 2 1/2 and is pregnant now.   Son Anthony just had a baby girl and has a step daughter.   Mom is 80 yrs old. [place] all [place].      Family hx of cardiomyopathy- ECHO 9/2021, EF 57.8% WNL    The 10-year ASCVD risk score (Nguyen BUTT, et al., 2019) is: 1.7%    Values used to calculate the score:      Age: 52 years      Sex: Female      Is Non- : No      Diabetic: No      Tobacco smoker: No      Systolic Blood Pressure: 122 mmHg      Is BP treated: No      HDL Cholesterol: 72 mg/dL      Total Cholesterol: 259 mg/dL    The following portions of the patient's history were reviewed and updated as appropriate: allergies, current medications, past family history, past medical history, past social history, past surgical history and problem list.    Review of Systems   Constitutional:  Negative for chills, fatigue and fever.   HENT:  Negative for ear pain, rhinorrhea and sore throat.    Eyes: Negative.    Respiratory:  Negative for cough and shortness of breath.    Cardiovascular: Negative.    Gastrointestinal: Negative.    Endocrine: Positive for cold intolerance. Negative for heat intolerance.   Genitourinary:  Negative for breast discharge, breast lump, breast pain, difficulty urinating, dyspareunia, dysuria, flank pain, frequency, genital sores, hematuria,  pelvic pain and urgency.   Musculoskeletal: Negative.    Skin: Negative.    Allergic/Immunologic: Negative for environmental allergies and food allergies.   Neurological: Negative.    Hematological: Negative.    Psychiatric/Behavioral:  Positive for stress. Negative for dysphoric mood and suicidal ideas. The patient is not nervous/anxious.        Objective   Physical Exam  Constitutional:       Appearance: Normal appearance. She is well-developed.      Comments: Body mass index is 30.07 kg/m².     HENT:      Right Ear: Hearing, tympanic membrane, ear canal and external ear normal.      Left Ear: Hearing, tympanic membrane, ear canal and external ear normal.   Eyes:      General: Lids are normal.      Conjunctiva/sclera: Conjunctivae normal.      Pupils: Pupils are equal, round, and reactive to light.   Neck:      Thyroid: No thyroid mass or thyromegaly.      Vascular: No carotid bruit.      Trachea: Trachea normal.   Cardiovascular:      Rate and Rhythm: Normal rate and regular rhythm.      Pulses: Normal pulses.      Heart sounds: Normal heart sounds.   Pulmonary:      Effort: Pulmonary effort is normal. No retractions.      Breath sounds: Normal breath sounds.   Chest:      Chest wall: No mass, lacerations, deformity, swelling, tenderness, crepitus or edema.   Breasts:     Breasts are symmetrical.      Right: Normal. No swelling, bleeding, inverted nipple, mass, nipple discharge, skin change or tenderness.      Left: Normal. No swelling, bleeding, inverted nipple, mass, nipple discharge, skin change or tenderness.   Abdominal:      General: Bowel sounds are normal.      Palpations: Abdomen is soft.      Tenderness: There is no abdominal tenderness.   Musculoskeletal:         General: Normal range of motion.      Cervical back: Normal range of motion.   Lymphadenopathy:      Cervical: No cervical adenopathy.      Upper Body:      Right upper body: No supraclavicular, axillary or pectoral adenopathy.      Left upper  body: No supraclavicular, axillary or pectoral adenopathy.      Lower Body: No right inguinal adenopathy. No left inguinal adenopathy.   Skin:     General: Skin is warm and dry.             Comments: L foot, 4th toe with synovial cyst present, a little larger than pencil eraser.    Neurological:      Mental Status: She is alert and oriented to person, place, and time.      GCS: GCS eye subscore is 4. GCS verbal subscore is 5. GCS motor subscore is 6.      Cranial Nerves: No cranial nerve deficit.      Sensory: No sensory deficit.      Deep Tendon Reflexes:      Reflex Scores:       Patellar reflexes are 2+ on the right side and 2+ on the left side.  Psychiatric:         Speech: Speech normal.         Behavior: Behavior normal. Behavior is cooperative.         Thought Content: Thought content normal.         Judgment: Judgment normal.         Vitals:    10/23/23 1449   BP: 122/80   Pulse: 68   SpO2: 98%     Body mass index is 30.07 kg/m².    No current outpatient medications on file.   Lab on 10/18/2023   Component Date Value Ref Range Status    25 Hydroxy, Vitamin D 10/18/2023 32.2  30.0 - 100.0 ng/mL Final    Comment: Vitamin D deficiency has been defined by the [place] [employer]  [employer] and an Endocrine Society practice guideline as a  level of serum 25-OH vitamin D less than 20 ng/mL (1,2).  The Endocrine Society went on to further define vitamin D  insufficiency as a level between 21 and 29 ng/mL (2).  1. IOM ([place] [employer]). 2010. Dietary reference     intakes for calcium and D. [place]: The     National Academies Press.  2. Justin MF, Carlota BOO, Alondra GUPTA, et al.     Evaluation, treatment, and prevention of vitamin D     deficiency: an Endocrine Society clinical practice     guideline. JCEM. 2011 Jul; 96(7):1911-30.      WBC 10/18/2023 5.4  3.4 - 10.8 x10E3/uL Final    RBC 10/18/2023 4.35  3.77 - 5.28 x10E6/uL Final    Hemoglobin 10/18/2023 13.0  11.1 - 15.9 g/dL Final    Hematocrit  10/18/2023 39.2  34.0 - 46.6 % Final    MCV 10/18/2023 90  79 - 97 fL Final    MCH 10/18/2023 29.9  26.6 - 33.0 pg Final    MCHC 10/18/2023 33.2  31.5 - 35.7 g/dL Final    RDW 10/18/2023 13.4  11.7 - 15.4 % Final    Platelets 10/18/2023 311  150 - 450 x10E3/uL Final    Neutrophil Rel % 10/18/2023 62  Not Estab. % Final    Lymphocyte Rel % 10/18/2023 25  Not Estab. % Final    Monocyte Rel % 10/18/2023 10  Not Estab. % Final    Eosinophil Rel % 10/18/2023 2  Not Estab. % Final    Basophil Rel % 10/18/2023 1  Not Estab. % Final    Neutrophils Absolute 10/18/2023 3.3  1.4 - 7.0 x10E3/uL Final    Lymphocytes Absolute 10/18/2023 1.3  0.7 - 3.1 x10E3/uL Final    Monocytes Absolute 10/18/2023 0.5  0.1 - 0.9 x10E3/uL Final    Eosinophils Absolute 10/18/2023 0.1  0.0 - 0.4 x10E3/uL Final    Basophils Absolute 10/18/2023 0.0  0.0 - 0.2 x10E3/uL Final    Immature Granulocyte Rel % 10/18/2023 0  Not Estab. % Final    Immature Grans Absolute 10/18/2023 0.0  0.0 - 0.1 x10E3/uL Final    TSH 10/18/2023 1.870  0.450 - 4.500 uIU/mL Final    Glucose 10/18/2023 89  70 - 99 mg/dL Final    BUN 10/18/2023 19  6 - 24 mg/dL Final    Creatinine 10/18/2023 0.81  0.57 - 1.00 mg/dL Final    EGFR Result 10/18/2023 86  >59 mL/min/1.73 Final    BUN/Creatinine Ratio 10/18/2023 23  9 - 23 Final    Sodium 10/18/2023 139  134 - 144 mmol/L Final    Potassium 10/18/2023 4.4  3.5 - 5.2 mmol/L Final    Chloride 10/18/2023 102  96 - 106 mmol/L Final    Total CO2 10/18/2023 23  20 - 29 mmol/L Final    Calcium 10/18/2023 9.1  8.7 - 10.2 mg/dL Final    Total Protein 10/18/2023 6.6  6.0 - 8.5 g/dL Final    Albumin 10/18/2023 4.4  3.8 - 4.9 g/dL Final    Globulin 10/18/2023 2.2  1.5 - 4.5 g/dL Final    A/G Ratio 10/18/2023 2.0  1.2 - 2.2 Final    Total Bilirubin 10/18/2023 0.8  0.0 - 1.2 mg/dL Final    Alkaline Phosphatase 10/18/2023 67  44 - 121 IU/L Final    AST (SGOT) 10/18/2023 27  0 - 40 IU/L Final    ALT (SGPT) 10/18/2023 24  0 - 32 IU/L Final    Total  Cholesterol 10/18/2023 259 (H)  100 - 199 mg/dL Final    Triglycerides 10/18/2023 79  0 - 149 mg/dL Final    HDL Cholesterol 10/18/2023 72  >39 mg/dL Final    VLDL Cholesterol Gerson 10/18/2023 13  5 - 40 mg/dL Final    LDL Chol Calc (NIH) 10/18/2023 174 (H)  0 - 99 mg/dL Final    LDL/HDL RATIO 10/18/2023 2.4  0.0 - 3.2 ratio Final    Comment:                                     LDL/HDL Ratio                                              Men  Women                                1/2 Avg.Risk  1.0    1.5                                    Avg.Risk  3.6    3.2                                 2X Avg.Risk  6.2    5.0                                 3X Avg.Risk  8.0    6.1        Assessment & Plan   Diagnoses and all orders for this visit:    1. Health care maintenance (Primary)  -     Lipid Panel With LDL / HDL Ratio; Future  -     Comprehensive Metabolic Panel; Future    2. Hyperlipidemia, unspecified hyperlipidemia type  -     Lipid Panel With LDL / HDL Ratio; Future  -     Comprehensive Metabolic Panel; Future    Other orders  -     Fluzone High-Dose 65+yrs ([id number])                 1. Preventative counseling- continue exercise, work on healthy diet   2. HPL- work on mediterranean style diet and exercise, CVD risk <7.5%, recheck this in 6 months

## 2024-05-09 ENCOUNTER — OFFICE VISIT (OUTPATIENT)
Dept: INTERNAL MEDICINE | Facility: CLINIC | Age: 54
End: 2024-05-09
Payer: COMMERCIAL

## 2024-05-09 VITALS
WEIGHT: 186 LBS | HEART RATE: 75 BPM | OXYGEN SATURATION: 96 % | SYSTOLIC BLOOD PRESSURE: 132 MMHG | BODY MASS INDEX: 31.76 KG/M2 | HEIGHT: 64 IN | DIASTOLIC BLOOD PRESSURE: 80 MMHG

## 2024-05-09 DIAGNOSIS — E78.5 HYPERLIPIDEMIA, UNSPECIFIED HYPERLIPIDEMIA TYPE: Primary | ICD-10-CM

## 2024-05-09 DIAGNOSIS — E66.9 CLASS 1 OBESITY WITHOUT SERIOUS COMORBIDITY WITH BODY MASS INDEX (BMI) OF 31.0 TO 31.9 IN ADULT, UNSPECIFIED OBESITY TYPE: ICD-10-CM

## 2024-05-09 PROCEDURE — 99214 OFFICE O/P EST MOD 30 MIN: CPT | Performed by: NURSE PRACTITIONER

## 2024-07-15 DIAGNOSIS — E66.9 CLASS 1 OBESITY WITHOUT SERIOUS COMORBIDITY WITH BODY MASS INDEX (BMI) OF 31.0 TO 31.9 IN ADULT, UNSPECIFIED OBESITY TYPE: ICD-10-CM

## 2024-07-16 RX ORDER — NALTREXONE HYDROCHLORIDE AND BUPROPION HYDROCHLORIDE 8; 90 MG/1; MG/1
2 TABLET, EXTENDED RELEASE ORAL 2 TIMES DAILY
Qty: 120 TABLET | Refills: 0 | Status: SHIPPED | OUTPATIENT
Start: 2024-07-16

## 2024-07-24 ENCOUNTER — APPOINTMENT (OUTPATIENT)
Dept: WOMENS IMAGING | Facility: HOSPITAL | Age: 54
End: 2024-07-24
Payer: COMMERCIAL

## 2024-07-24 PROCEDURE — 77063 BREAST TOMOSYNTHESIS BI: CPT | Performed by: RADIOLOGY

## 2024-07-24 PROCEDURE — 77067 SCR MAMMO BI INCL CAD: CPT | Performed by: RADIOLOGY

## 2024-08-08 ENCOUNTER — OFFICE VISIT (OUTPATIENT)
Dept: INTERNAL MEDICINE | Facility: CLINIC | Age: 54
End: 2024-08-08
Payer: COMMERCIAL

## 2024-08-08 VITALS
DIASTOLIC BLOOD PRESSURE: 80 MMHG | WEIGHT: 179 LBS | OXYGEN SATURATION: 97 % | HEIGHT: 64 IN | SYSTOLIC BLOOD PRESSURE: 122 MMHG | HEART RATE: 71 BPM | BODY MASS INDEX: 30.56 KG/M2

## 2024-08-08 DIAGNOSIS — E66.9 OBESITY (BMI 30.0-34.9): ICD-10-CM

## 2024-08-08 PROCEDURE — 99214 OFFICE O/P EST MOD 30 MIN: CPT | Performed by: NURSE PRACTITIONER

## 2024-08-08 RX ORDER — NALTREXONE HYDROCHLORIDE AND BUPROPION HYDROCHLORIDE 8; 90 MG/1; MG/1
2 TABLET, EXTENDED RELEASE ORAL 2 TIMES DAILY
Qty: 120 TABLET | Refills: 1 | Status: SHIPPED | OUTPATIENT
Start: 2024-08-08

## 2024-08-08 NOTE — PROGRESS NOTES
Subjective   Carmen Matthews is a 53 y.o. female.      History of Present Illness   The patient is here today to F/U on obesity. At last visit contrave started. Down 7 lbs. Feeling well with this. No SEs. Smaller portions sizes, fewer cravings. Clothes are fitting better. Working out regularly, using heavier weights.     Mom passed at 81 yrs old. She passed in her chair at home.   The following portions of the patient's history were reviewed and updated as appropriate: allergies, current medications, past family history, past medical history, past social history, past surgical history and problem list.    Review of Systems   Constitutional: Negative.    Respiratory: Negative.     Cardiovascular: Negative.    Psychiatric/Behavioral: Negative.         Objective   Physical Exam  Constitutional:       Appearance: Normal appearance. She is well-developed.   Neck:      Thyroid: No thyromegaly.   Cardiovascular:      Rate and Rhythm: Normal rate and regular rhythm.      Heart sounds: Normal heart sounds.   Pulmonary:      Effort: Pulmonary effort is normal.      Breath sounds: Normal breath sounds.   Musculoskeletal:      Cervical back: Normal range of motion and neck supple.   Lymphadenopathy:      Cervical: No cervical adenopathy.   Skin:     General: Skin is warm and dry.   Neurological:      Mental Status: She is alert.   Psychiatric:         Behavior: Behavior normal.         Thought Content: Thought content normal.         Judgment: Judgment normal.         Vitals:    08/08/24 0750   BP: 122/80   Pulse: 71   SpO2: 97%     Body mass index is 30.71 kg/m².      Current Outpatient Medications:     naltrexone-bupropion ER (Contrave) 8-90 MG tablet, Take 2 tablets by mouth 2 (Two) Times a Day., Disp: 120 tablet, Rfl: 1  Assessment & Plan   Diagnoses and all orders for this visit:    1. Obesity (BMI 30.0-34.9)  -     naltrexone-bupropion ER (Contrave) 8-90 MG tablet; Take 2 tablets by mouth 2 (Two) Times a Day.  Dispense: 120  tablet; Refill: 1               1. Obesity- wt loss a little on the low end, work on healthy diet and exercise, re che in October

## 2024-10-01 DIAGNOSIS — E66.811 OBESITY (BMI 30.0-34.9): ICD-10-CM

## 2024-10-01 RX ORDER — NALTREXONE HYDROCHLORIDE AND BUPROPION HYDROCHLORIDE 8; 90 MG/1; MG/1
2 TABLET, EXTENDED RELEASE ORAL 2 TIMES DAILY
Qty: 120 TABLET | Refills: 0 | Status: SHIPPED | OUTPATIENT
Start: 2024-10-01

## 2024-10-21 ENCOUNTER — LAB (OUTPATIENT)
Dept: INTERNAL MEDICINE | Facility: CLINIC | Age: 54
End: 2024-10-21
Payer: COMMERCIAL

## 2024-10-21 DIAGNOSIS — Z00.00 HEALTH CARE MAINTENANCE: Primary | ICD-10-CM

## 2024-10-21 LAB
ALBUMIN SERPL-MCNC: 4.2 G/DL (ref 3.5–5.2)
ALBUMIN/GLOB SERPL: 1.9 G/DL
ALP SERPL-CCNC: 79 U/L (ref 39–117)
ALT SERPL-CCNC: 24 U/L (ref 1–33)
AST SERPL-CCNC: 21 U/L (ref 1–32)
BASOPHILS # BLD AUTO: 0.06 10*3/MM3 (ref 0–0.2)
BASOPHILS NFR BLD AUTO: 0.8 % (ref 0–1.5)
BILIRUB SERPL-MCNC: 0.5 MG/DL (ref 0–1.2)
BUN SERPL-MCNC: 13 MG/DL (ref 6–20)
BUN/CREAT SERPL: 13 (ref 7–25)
CALCIUM SERPL-MCNC: 9.2 MG/DL (ref 8.6–10.5)
CHLORIDE SERPL-SCNC: 104 MMOL/L (ref 98–107)
CHOLEST SERPL-MCNC: 245 MG/DL (ref 0–200)
CHOLEST/HDLC SERPL: 3.83 {RATIO}
CO2 SERPL-SCNC: 28.1 MMOL/L (ref 22–29)
CREAT SERPL-MCNC: 1 MG/DL (ref 0.57–1)
EGFRCR SERPLBLD CKD-EPI 2021: 66.7 ML/MIN/1.73
EOSINOPHIL # BLD AUTO: 0.13 10*3/MM3 (ref 0–0.4)
EOSINOPHIL NFR BLD AUTO: 1.8 % (ref 0.3–6.2)
ERYTHROCYTE [DISTWIDTH] IN BLOOD BY AUTOMATED COUNT: 13.1 % (ref 12.3–15.4)
GLOBULIN SER CALC-MCNC: 2.2 GM/DL
GLUCOSE SERPL-MCNC: 79 MG/DL (ref 65–99)
HCT VFR BLD AUTO: 40 % (ref 34–46.6)
HDLC SERPL-MCNC: 64 MG/DL (ref 40–60)
HGB BLD-MCNC: 13.3 G/DL (ref 12–15.9)
IMM GRANULOCYTES # BLD AUTO: 0.03 10*3/MM3 (ref 0–0.05)
IMM GRANULOCYTES NFR BLD AUTO: 0.4 % (ref 0–0.5)
LDLC SERPL CALC-MCNC: 153 MG/DL (ref 0–100)
LYMPHOCYTES # BLD AUTO: 1.3 10*3/MM3 (ref 0.7–3.1)
LYMPHOCYTES NFR BLD AUTO: 18.4 % (ref 19.6–45.3)
MCH RBC QN AUTO: 29.8 PG (ref 26.6–33)
MCHC RBC AUTO-ENTMCNC: 33.3 G/DL (ref 31.5–35.7)
MCV RBC AUTO: 89.7 FL (ref 79–97)
MONOCYTES # BLD AUTO: 0.57 10*3/MM3 (ref 0.1–0.9)
MONOCYTES NFR BLD AUTO: 8.1 % (ref 5–12)
NEUTROPHILS # BLD AUTO: 4.98 10*3/MM3 (ref 1.7–7)
NEUTROPHILS NFR BLD AUTO: 70.5 % (ref 42.7–76)
NRBC BLD AUTO-RTO: 0 /100 WBC (ref 0–0.2)
PLATELET # BLD AUTO: 297 10*3/MM3 (ref 140–450)
POTASSIUM SERPL-SCNC: 4.2 MMOL/L (ref 3.5–5.2)
PROT SERPL-MCNC: 6.4 G/DL (ref 6–8.5)
RBC # BLD AUTO: 4.46 10*6/MM3 (ref 3.77–5.28)
SODIUM SERPL-SCNC: 142 MMOL/L (ref 136–145)
TRIGL SERPL-MCNC: 157 MG/DL (ref 0–150)
TSH SERPL DL<=0.005 MIU/L-ACNC: 3.32 UIU/ML (ref 0.27–4.2)
VLDLC SERPL CALC-MCNC: 28 MG/DL (ref 5–40)
WBC # BLD AUTO: 7.07 10*3/MM3 (ref 3.4–10.8)

## 2024-10-28 ENCOUNTER — OFFICE VISIT (OUTPATIENT)
Dept: INTERNAL MEDICINE | Facility: CLINIC | Age: 54
End: 2024-10-28
Payer: COMMERCIAL

## 2024-10-28 VITALS
BODY MASS INDEX: 30.56 KG/M2 | DIASTOLIC BLOOD PRESSURE: 78 MMHG | SYSTOLIC BLOOD PRESSURE: 140 MMHG | HEART RATE: 81 BPM | OXYGEN SATURATION: 98 % | HEIGHT: 64 IN | WEIGHT: 179 LBS

## 2024-10-28 DIAGNOSIS — Z00.00 HEALTH CARE MAINTENANCE: Primary | ICD-10-CM

## 2024-10-28 DIAGNOSIS — E78.5 HYPERLIPIDEMIA, UNSPECIFIED HYPERLIPIDEMIA TYPE: ICD-10-CM

## 2024-10-28 DIAGNOSIS — E66.811 OBESITY (BMI 30.0-34.9): ICD-10-CM

## 2024-10-28 DIAGNOSIS — I10 PRIMARY HYPERTENSION: ICD-10-CM

## 2024-10-28 DIAGNOSIS — E66.811 CLASS 1 OBESITY WITHOUT SERIOUS COMORBIDITY WITH BODY MASS INDEX (BMI) OF 31.0 TO 31.9 IN ADULT, UNSPECIFIED OBESITY TYPE: ICD-10-CM

## 2024-10-28 DIAGNOSIS — Z23 NEED FOR INFLUENZA VACCINATION: ICD-10-CM

## 2024-10-28 PROCEDURE — 99396 PREV VISIT EST AGE 40-64: CPT | Performed by: NURSE PRACTITIONER

## 2024-10-28 RX ORDER — ATORVASTATIN CALCIUM 20 MG/1
20 TABLET, FILM COATED ORAL DAILY
Qty: 90 TABLET | Refills: 1 | Status: SHIPPED | OUTPATIENT
Start: 2024-10-28

## 2024-10-28 NOTE — PROGRESS NOTES
Subjective   Carmen Matthews is a 53 y.o. female.     History of Present Illness   The patient is here today for CPE and lab work F/U.     Contrave started in May, lost 7 lbs, no further wt loss since then. She is working out a lot with weights, feels her clothes are feeling better. Gaining muscle.      To have cataract sx soon.   The following portions of the patient's history were reviewed and updated as appropriate: allergies, current medications, past family history, past medical history, past social history, past surgical history and problem list.    Review of Systems   Constitutional: Negative.  Negative for chills, fatigue and fever.   HENT: Negative.  Negative for ear pain, rhinorrhea and sore throat.    Eyes: Negative.    Respiratory: Negative.  Negative for cough and shortness of breath.    Cardiovascular: Negative.    Gastrointestinal: Negative.    Endocrine: Positive for heat intolerance (at night sometimes). Negative for cold intolerance.   Genitourinary:  Negative for breast discharge, breast lump, breast pain, difficulty urinating, dyspareunia, dysuria, flank pain, frequency, genital sores, hematuria, pelvic pain and urgency.   Musculoskeletal: Negative.    Skin: Negative.    Allergic/Immunologic: Negative for environmental allergies and food allergies.   Neurological: Negative.    Hematological: Negative.    Psychiatric/Behavioral:  Negative for dysphoric mood and suicidal ideas. The patient is not nervous/anxious.        Objective   Physical Exam  Constitutional:       Appearance: Normal appearance. She is well-developed.      Comments: Body mass index is 30.71 kg/m².     HENT:      Right Ear: Hearing, tympanic membrane, ear canal and external ear normal.      Left Ear: Hearing, tympanic membrane, ear canal and external ear normal.   Eyes:      General: Lids are normal.      Conjunctiva/sclera: Conjunctivae normal.      Pupils: Pupils are equal, round, and reactive to light.   Neck:      Thyroid: No  thyroid mass or thyromegaly.      Vascular: No carotid bruit.      Trachea: Trachea normal.   Cardiovascular:      Rate and Rhythm: Normal rate and regular rhythm.      Pulses: Normal pulses.      Heart sounds: Normal heart sounds.   Pulmonary:      Effort: Pulmonary effort is normal. No retractions.      Breath sounds: Normal breath sounds.   Chest:      Chest wall: No mass, lacerations, deformity, swelling, tenderness, crepitus or edema.   Breasts:     Breasts are symmetrical.      Right: Normal. No swelling, bleeding, inverted nipple, mass, nipple discharge, skin change or tenderness.      Left: Normal. No swelling, bleeding, inverted nipple, mass, nipple discharge, skin change or tenderness.   Abdominal:      General: Bowel sounds are normal.      Palpations: Abdomen is soft.      Tenderness: There is no abdominal tenderness.   Musculoskeletal:         General: Normal range of motion.      Cervical back: Normal range of motion.   Lymphadenopathy:      Cervical: No cervical adenopathy.      Upper Body:      Right upper body: No supraclavicular, axillary or pectoral adenopathy.      Left upper body: No supraclavicular, axillary or pectoral adenopathy.      Lower Body: No right inguinal adenopathy. No left inguinal adenopathy.   Skin:     General: Skin is warm and dry.   Neurological:      Mental Status: She is alert and oriented to person, place, and time.      GCS: GCS eye subscore is 4. GCS verbal subscore is 5. GCS motor subscore is 6.      Cranial Nerves: No cranial nerve deficit.      Sensory: No sensory deficit.      Deep Tendon Reflexes:      Reflex Scores:       Patellar reflexes are 2+ on the right side and 2+ on the left side.  Psychiatric:         Speech: Speech normal.         Behavior: Behavior normal. Behavior is cooperative.         Thought Content: Thought content normal.         Judgment: Judgment normal.         Vitals:    10/28/24 1408   BP: 140/78   Pulse: 81   SpO2: 98%     Body mass index is  30.71 kg/m².    Current Outpatient Medications:     Contrave 8-90 MG tablet, Take two tablets by mouth twice a day, Disp: 120 tablet, Rfl: 0    atorvastatin (Lipitor) 20 MG tablet, Take 1 tablet by mouth Daily., Disp: 90 tablet, Rfl: 1   Lab on 10/21/2024   Component Date Value Ref Range Status    WBC 10/21/2024 7.07  3.40 - 10.80 10*3/mm3 Final    RBC 10/21/2024 4.46  3.77 - 5.28 10*6/mm3 Final    Hemoglobin 10/21/2024 13.3  12.0 - 15.9 g/dL Final    Hematocrit 10/21/2024 40.0  34.0 - 46.6 % Final    MCV 10/21/2024 89.7  79.0 - 97.0 fL Final    MCH 10/21/2024 29.8  26.6 - 33.0 pg Final    MCHC 10/21/2024 33.3  31.5 - 35.7 g/dL Final    RDW 10/21/2024 13.1  12.3 - 15.4 % Final    Platelets 10/21/2024 297  140 - 450 10*3/mm3 Final    Neutrophil Rel % 10/21/2024 70.5  42.7 - 76.0 % Final    Lymphocyte Rel % 10/21/2024 18.4 (L)  19.6 - 45.3 % Final    Monocyte Rel % 10/21/2024 8.1  5.0 - 12.0 % Final    Eosinophil Rel % 10/21/2024 1.8  0.3 - 6.2 % Final    Basophil Rel % 10/21/2024 0.8  0.0 - 1.5 % Final    Neutrophils Absolute 10/21/2024 4.98  1.70 - 7.00 10*3/mm3 Final    Lymphocytes Absolute 10/21/2024 1.30  0.70 - 3.10 10*3/mm3 Final    Monocytes Absolute 10/21/2024 0.57  0.10 - 0.90 10*3/mm3 Final    Eosinophils Absolute 10/21/2024 0.13  0.00 - 0.40 10*3/mm3 Final    Basophils Absolute 10/21/2024 0.06  0.00 - 0.20 10*3/mm3 Final    Immature Granulocyte Rel % 10/21/2024 0.4  0.0 - 0.5 % Final    Immature Grans Absolute 10/21/2024 0.03  0.00 - 0.05 10*3/mm3 Final    nRBC 10/21/2024 0.0  0.0 - 0.2 /100 WBC Final    Glucose 10/21/2024 79  65 - 99 mg/dL Final    BUN 10/21/2024 13  6 - 20 mg/dL Final    Creatinine 10/21/2024 1.00  0.57 - 1.00 mg/dL Final    EGFR Result 10/21/2024 66.7  >60.0 mL/min/1.73 Final    Comment: GFR Normal >60  Chronic Kidney Disease <60  Kidney Failure <15      BUN/Creatinine Ratio 10/21/2024 13.0  7.0 - 25.0 Final    Sodium 10/21/2024 142  136 - 145 mmol/L Final    Potassium 10/21/2024 4.2   3.5 - 5.2 mmol/L Final    Chloride 10/21/2024 104  98 - 107 mmol/L Final    Total CO2 10/21/2024 28.1  22.0 - 29.0 mmol/L Final    Calcium 10/21/2024 9.2  8.6 - 10.5 mg/dL Final    Total Protein 10/21/2024 6.4  6.0 - 8.5 g/dL Final    Albumin 10/21/2024 4.2  3.5 - 5.2 g/dL Final    Globulin 10/21/2024 2.2  gm/dL Final    A/G Ratio 10/21/2024 1.9  g/dL Final    Total Bilirubin 10/21/2024 0.5  0.0 - 1.2 mg/dL Final    Alkaline Phosphatase 10/21/2024 79  39 - 117 U/L Final    AST (SGOT) 10/21/2024 21  1 - 32 U/L Final    ALT (SGPT) 10/21/2024 24  1 - 33 U/L Final    Total Cholesterol 10/21/2024 245 (H)  0 - 200 mg/dL Final    Comment: Cholesterol Reference Ranges  (U.S. Department of Health and Human Services ATP III  Classifications)  Desirable          <200 mg/dL  Borderline High    200-239 mg/dL  High Risk          >240 mg/dL  Triglyceride Reference Ranges  (U.S. Department of Health and Human Services ATP III  Classifications)  Normal           <150 mg/dL  Borderline High  150-199 mg/dL  High             200-499 mg/dL  Very High        >500 mg/dL  HDL Reference Ranges  (U.S. Department of Health and Human Services ATP III  Classifications)  Low     <40 mg/dl (major risk factor for CHD)  High    >60 mg/dl ('negative' risk factor for CHD)  LDL Reference Ranges  (U.S. Department of Health and Human Services ATP III  Classifications)  Optimal          <100 mg/dL  Near Optimal     100-129 mg/dL  Borderline High  130-159 mg/dL  High             160-189 mg/dL  Very High        >189 mg/dL      Triglycerides 10/21/2024 157 (H)  0 - 150 mg/dL Final    HDL Cholesterol 10/21/2024 64 (H)  40 - 60 mg/dL Final    VLDL Cholesterol Gerson 10/21/2024 28  5 - 40 mg/dL Final    LDL Chol Calc (NIH) 10/21/2024 153 (H)  0 - 100 mg/dL Final    Chol/HDL Ratio 10/21/2024 3.83   Final    TSH 10/21/2024 3.320  0.270 - 4.200 uIU/mL Final        Assessment & Plan   Diagnoses and all orders for this visit:    1. Health care maintenance  (Primary)    2. Primary hypertension    3. Class 1 obesity without serious comorbidity with body mass index (BMI) of 31.0 to 31.9 in adult, unspecified obesity type    4. Hyperlipidemia, unspecified hyperlipidemia type  -     Lipid Panel With LDL / HDL Ratio; Future  -     Hepatic Function Panel; Future  -     atorvastatin (Lipitor) 20 MG tablet; Take 1 tablet by mouth Daily.  Dispense: 90 tablet; Refill: 1                 1. Preventative counseling- continue to eat healthy and exercise   2. HTN- feels better on contrave, could be SE, monitor BP at home and send #s in 2 weeks, if still elevated will wean off med.   Goal BP <130/80  3. Obesity- if needed would recommend SGLT2  4. HPL- suggest a statin

## 2024-10-28 NOTE — PATIENT INSTRUCTIONS
1. Preventative counseling- continue to eat healthy and exercise   2. HTN- feels better on contrave, could be SE, monitor BP at home and send #s in 2 weeks, if still elevated will wean off med.   Goal BP <130/80  3. Obesity- if needed would recommend SGLT2  4. HPL- suggest a statin

## 2024-10-29 RX ORDER — NALTREXONE HYDROCHLORIDE AND BUPROPION HYDROCHLORIDE 8; 90 MG/1; MG/1
2 TABLET, EXTENDED RELEASE ORAL 2 TIMES DAILY
Qty: 120 TABLET | Refills: 0 | Status: SHIPPED | OUTPATIENT
Start: 2024-10-29

## 2024-11-25 DIAGNOSIS — E66.811 OBESITY (BMI 30.0-34.9): ICD-10-CM

## 2024-11-25 RX ORDER — NALTREXONE HYDROCHLORIDE AND BUPROPION HYDROCHLORIDE 8; 90 MG/1; MG/1
2 TABLET, EXTENDED RELEASE ORAL 2 TIMES DAILY
Qty: 120 TABLET | Refills: 0 | Status: SHIPPED | OUTPATIENT
Start: 2024-11-25

## 2024-12-23 DIAGNOSIS — E66.811 OBESITY (BMI 30.0-34.9): ICD-10-CM

## 2024-12-23 RX ORDER — NALTREXONE HYDROCHLORIDE AND BUPROPION HYDROCHLORIDE 8; 90 MG/1; MG/1
2 TABLET, EXTENDED RELEASE ORAL 2 TIMES DAILY
Qty: 120 TABLET | Refills: 0 | Status: SHIPPED | OUTPATIENT
Start: 2024-12-23

## 2025-01-20 DIAGNOSIS — E66.811 OBESITY (BMI 30.0-34.9): ICD-10-CM

## 2025-01-21 RX ORDER — NALTREXONE HYDROCHLORIDE AND BUPROPION HYDROCHLORIDE 8; 90 MG/1; MG/1
2 TABLET, EXTENDED RELEASE ORAL 2 TIMES DAILY
Qty: 120 TABLET | Refills: 0 | Status: SHIPPED | OUTPATIENT
Start: 2025-01-21 | End: 2025-01-22

## 2025-01-22 ENCOUNTER — OFFICE VISIT (OUTPATIENT)
Dept: INTERNAL MEDICINE | Facility: CLINIC | Age: 55
End: 2025-01-22
Payer: COMMERCIAL

## 2025-01-22 VITALS
HEART RATE: 85 BPM | HEIGHT: 64 IN | DIASTOLIC BLOOD PRESSURE: 70 MMHG | WEIGHT: 169.6 LBS | BODY MASS INDEX: 28.95 KG/M2 | SYSTOLIC BLOOD PRESSURE: 118 MMHG | TEMPERATURE: 98.3 F | OXYGEN SATURATION: 96 %

## 2025-01-22 DIAGNOSIS — H47.011 OPTIC NERVE ISCHEMIA, RIGHT: Primary | ICD-10-CM

## 2025-01-22 DIAGNOSIS — E78.5 HYPERLIPIDEMIA, UNSPECIFIED HYPERLIPIDEMIA TYPE: ICD-10-CM

## 2025-01-22 PROCEDURE — 99214 OFFICE O/P EST MOD 30 MIN: CPT | Performed by: NURSE PRACTITIONER

## 2025-01-22 RX ORDER — ATORVASTATIN CALCIUM 40 MG/1
40 TABLET, FILM COATED ORAL DAILY
Qty: 90 TABLET | Refills: 2 | Status: SHIPPED | OUTPATIENT
Start: 2025-01-22

## 2025-01-22 RX ORDER — ASPIRIN 81 MG/1
81 TABLET, CHEWABLE ORAL DAILY
Start: 2025-01-22

## 2025-01-22 NOTE — PROGRESS NOTES
Subjective   Carmen Matthews is a 54 y.o. female.     History of Present Illness    The patient is here today with c/o optic nerve edema, found by eye specialist on 3rd check up after cataract removal. BP had been up, saw ophthalmic specialist, 1/21st. BP was 152/106. They have ordered VIDHYA.   She will have an MRI of brain in Feb.     This was all asymptomatic for her. Peripheral vision is still intact, pt notes just a little cloudiness.     She has successfully lost weight with WW.     The following portions of the patient's history were reviewed and updated as appropriate: allergies, current medications, past family history, past medical history, past social history, past surgical history and problem list.    Review of Systems   Constitutional:  Negative for chills and fever.   Eyes:  Positive for blurred vision.   Respiratory: Negative.  Negative for shortness of breath.    Cardiovascular: Negative.  Negative for chest pain and palpitations.   Neurological: Negative.    Psychiatric/Behavioral:  Negative for dysphoric mood and suicidal ideas. The patient is not nervous/anxious.        Objective   Physical Exam  Constitutional:       Appearance: Normal appearance. She is well-developed.   Neck:      Thyroid: No thyromegaly.   Cardiovascular:      Rate and Rhythm: Normal rate and regular rhythm.      Heart sounds: Normal heart sounds.   Pulmonary:      Effort: Pulmonary effort is normal.      Breath sounds: Normal breath sounds.   Musculoskeletal:      Cervical back: Normal range of motion and neck supple.   Lymphadenopathy:      Cervical: No cervical adenopathy.   Skin:     General: Skin is warm and dry.   Neurological:      Mental Status: She is alert.      Cranial Nerves: Cranial nerves 2-12 are intact.      Motor: Motor function is intact.      Gait: Gait is intact.   Psychiatric:         Behavior: Behavior normal.         Thought Content: Thought content normal.         Judgment: Judgment normal.         Vitals:     01/22/25 0955   BP: 118/70   Pulse: 85   Temp: 98.3 °F (36.8 °C)   SpO2: 96%     Body mass index is 29.1 kg/m².      Assessment & Plan   Diagnoses and all orders for this visit:    1. Optic nerve ischemia, right (Primary)  -     CT Cardiac Calcium Score Without Dye; Future  -     Vascular Screening (Bundle) CAR; Future  -     Ambulatory Referral to Sleep Medicine    2. Hyperlipidemia, unspecified hyperlipidemia type  -     atorvastatin (Lipitor) 40 MG tablet; Take 1 tablet by mouth Daily.  Dispense: 90 tablet; Refill: 2  -     CT Cardiac Calcium Score Without Dye; Future  -     Vascular Screening (Bundle) CAR; Future  -     Ambulatory Referral to Sleep Medicine    Other orders  -     aspirin 81 MG chewable tablet; Chew 1 tablet Daily.                 1. Optic nerve ischemia- start baby ASA, increase lipitor to 40 mg daily, notify for SEs, order sleep study, check vascular screenings.    2. HTN- bring home cuff in for accuracy check, d/c contrave, notify if bp >130/80

## 2025-01-23 DIAGNOSIS — E78.5 HYPERLIPIDEMIA, UNSPECIFIED HYPERLIPIDEMIA TYPE: ICD-10-CM

## 2025-01-23 DIAGNOSIS — H47.011 OPTIC NERVE ISCHEMIA, RIGHT: ICD-10-CM

## 2025-01-24 ENCOUNTER — TELEPHONE (OUTPATIENT)
Dept: INTERNAL MEDICINE | Facility: CLINIC | Age: 55
End: 2025-01-24
Payer: COMMERCIAL

## 2025-01-24 NOTE — TELEPHONE ENCOUNTER
Patient came in to compare her home BP cuff to a manual reading in the office. The readings were as follows:    Auto.: 141/74    Manual: 134/80

## 2025-02-10 ENCOUNTER — OFFICE VISIT (OUTPATIENT)
Facility: HOSPITAL | Age: 55
End: 2025-02-10
Payer: COMMERCIAL

## 2025-02-10 VITALS
WEIGHT: 165 LBS | OXYGEN SATURATION: 98 % | HEART RATE: 62 BPM | DIASTOLIC BLOOD PRESSURE: 83 MMHG | BODY MASS INDEX: 29.23 KG/M2 | SYSTOLIC BLOOD PRESSURE: 135 MMHG | HEIGHT: 63 IN

## 2025-02-10 DIAGNOSIS — H47.011 OPTIC NERVE ISCHEMIA, RIGHT: ICD-10-CM

## 2025-02-10 DIAGNOSIS — R29.818 SUSPECTED SLEEP APNEA: Primary | ICD-10-CM

## 2025-02-10 DIAGNOSIS — R06.83 SNORING: ICD-10-CM

## 2025-02-10 DIAGNOSIS — E66.3 OVERWEIGHT (BMI 25.0-29.9): ICD-10-CM

## 2025-02-10 PROCEDURE — G0463 HOSPITAL OUTPT CLINIC VISIT: HCPCS

## 2025-02-10 NOTE — PROGRESS NOTES
[facility]  [address] [facility] [address]   [address]  [place] [address]  Phone [phone]  Fax [phone]      Carmen Matthews  [id number]   1970  54 y.o.  female      Referring Provider and PCP: Jojo Jeter APRN    Type of service: Initial Sleep Medicine Consult  Date of service: 2/10/2025          CHIEF COMPLAINT: Suspected sleep apnea      HISTORY OF PRESENT ILLNESS:  Carmen Matthews 54 y.o. was seen today on 2/10/2025 at [facility].   Patient has a history of hyperlipidemia, cataracts, dry eyes, ischemic optic neuropathy, for which the patient follows with outside providers.  Patient has no history of tonsillectomy, adenoidectomy, nasal surgery, UPPP.  Patient presents today with symptoms of snoring, non-restorative sleep, and suspected sleep apnea.  She does report long history of snoring, improved when she is able to lose weight.  Has done well with weight watchers in the past and she is considering signing back up for this.  More recently she developed some blurry vision and was told she had optic nerve ischemia on the right and I provider felt that this was strongly suspicious of possible sleep apnea being present.  She has not had a sleep study before.  She does occasionally have some trouble sleeping at night if her mind is racing due to life stressors, but more situational.  Usually sleeps okay, may wake up a couple times to use the restroom at night.          SLEEP HISTORY:  Sleep schedule:  Bedtime: 8 to 10 PM  Wake time: 430 to 6 AM  Time it takes to fall asleep: A few minutes  Average hours of sleep: 5-6  Number of naps per day: Rarely on the weekends only    Symptoms:   In addition to the above, patient reports the following associated symptoms:  Have you ever awakened gasping for breath, coughing, choking: No   Change in weight: Up and down  Morning headaches:  No   Awaken with a sore throat or dry mouth:  No   Leg jerking at night:  No   Creepy crawly feeling in  "legs/urge to move legs: No   Teeth grinding: No   Have you ever awakened at night with a sour taste or burning sensation in your chest:  No   Do you have muscle weakness with laughing or anger:  No   Have you ever felt paralyzed while going to sleep or waking up:  No   Sleepwalking: No   Nightmares: No   Nocturia (urination at night): 2 times per night  Memory Problem: No     Medical Conditions (PMH):   Optic nerve ischemia  Hyperlipidemia    Social history:  Do you drive a commercial vehicle:  No   Shift work:  No   Tobacco use:  No   Alcohol use: 2-3 per year  Caffeinated drinks: 0 per day  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  Niece and sons and some of her grandkids live on the land as well in separate homes.      Family History (parents and siblings) (pertaining to sleep medicine):  Obesity    Medications: reviewed    Allergies:  Patient has no known allergies.      REVIEW OF SYSTEMS:  Pertinent positive symptoms are:  Snoring  [place] Sleepiness Scale of Total score: 8   Fatigue         PHYSICAL EXAM:  CONSTITUTINONAL:   Vitals:    02/10/25 1300   BP: 135/83   BP Location: Left arm   Patient Position: Sitting   Pulse: 62   SpO2: 98%   Weight: 74.8 kg (165 lb)   Height: 160 cm (63\")    Body mass index is 29.23 kg/m².   HEAD: atraumatic, normocephalic   THROAT: tonsils are not significant, Mallampati class III-IV  NECK: Neck Circumference: 14.5 inches, trachea is midline  RESPIRATORY SYSTEM: Respirations even, unlabored, normal rate  CARDIOVASULAR SYSTEM: Normal rate, no edema   NEUROLOGICAL SYSTEM: Alert and oriented x 3  PSYCHIATRIC SYSTEM: Mood is normal/ appropriate     Office note(s) from care team reviewed. Office note(s) reviewed: 1/22/2025 family medicine note    Labs/ Test Results Reviewed:  TSH          10/21/2024    07:07   TSH   TSH 3.320                ASSESSMENT AND PLAN:   Suspected sleep apnea: patient's symptoms and physical examination are concerning " for possible sleep apnea.   I discussed the signs, symptoms, and pathophysiology of sleep apnea with this patient.  I also discussed the possible complications of untreated sleep apnea including but not limited to potential risk of resistant hypertension, insulin resistance, pulmonary hypertension, atrial fibrillation or other arrhythmias, heart attack, stroke, nonrestorative sleep with hypersomnia which can increase risk for motor vehicle accidents, etc.   Different testing methods including home-based and lab based sleep studies were discussed with this patient.   Based on patient history and physical examination, will proceed with home sleep study.  The order for the sleep study is placed in [facility].  The test will be scheduled after prior authorization has been obtained through patient's insurance.  Discussed overview of treatment options for sleep apnea in the office today including PAP therapy and oral mandibular advancement device, and treatment/ management will be discussed in more detail with this patient after the test is completed.  All questions were answered to patient's satisfaction.   Snoring: snoring is the sound created by turbulent airflow vibrating upper airway soft tissue.  I have also discussed factors affecting snoring including sleep deprivation, sleeping on the back and alcohol ingestion. To minimize snoring, patient is advised to have adequate sleep, sleep on their side, and avoid alcohol and sedative medications around bedtime.   Daytime fatigue:  [place] Sleepiness Scale of Total score: 8.  There are many causes of daytime sleepiness and/or fatigue.  Rule out sleep apnea as a contributing factor, as above.  Do not drive, operate heavy machinery, or do activities that require high concentration if feeling tired/drowsy.  Overweight: Body mass index is 29.23 kg/m².. Patients who are overweight or obese are at increased risk of sleep apnea/ sleep disordered breathing. Weight reduction and healthy  lifestyle are encouraged in overweight/ obese patients as part of a comprehensive approach to sleep apnea treatment.    Occasional trouble sleeping: We discussed healthy sleep habits in detail.  Discussed trying to have a regular bedtime and wake time when possible, try to avoid electronic use in the bedroom, trying of a cool quiet sleep environment, discussed the CBT-I  elsy and mindfulness  elsy, free through the [facility], discussed most people need 7 to 9 hours of sleep per night.  Patient feels symptoms are just occasional/situational, not chronic or persistent at this point.  She will let us know if symptoms worsen in the future at which point could consider referral to sleep psychologist.  Optic nerve ischemia: Following with PCP and [facility] eye specialist.  It was recommended that patient be evaluated for sleep apnea as well as a possible contributing factor.  Sleep study as above.    Patient will follow-up after study, 31 to 90 days after PAP therapy initiated if applicable, or contact the office sooner for questions or concerns. Patient's questions were answered.          Thank you again for asking me to consult on this patient.  Please do not hesitate to call me if you have additional questions or concerns.       Alysa Carter DNP, APRN  [facility]

## 2025-02-18 ENCOUNTER — APPOINTMENT (OUTPATIENT)
Dept: SLEEP MEDICINE | Facility: HOSPITAL | Age: 55
End: 2025-02-18
Payer: COMMERCIAL

## 2025-02-19 DIAGNOSIS — E78.5 HYPERLIPIDEMIA, UNSPECIFIED HYPERLIPIDEMIA TYPE: ICD-10-CM

## 2025-02-21 LAB
ALBUMIN SERPL-MCNC: 4.4 G/DL (ref 3.5–5.2)
ALP SERPL-CCNC: 76 U/L (ref 39–117)
ALT SERPL-CCNC: 12 U/L (ref 1–33)
AST SERPL-CCNC: 16 U/L (ref 1–32)
BILIRUB DIRECT SERPL-MCNC: 0.2 MG/DL (ref 0–0.3)
BILIRUB SERPL-MCNC: 0.7 MG/DL (ref 0–1.2)
CHOLEST SERPL-MCNC: 129 MG/DL (ref 0–200)
HDLC SERPL-MCNC: 50 MG/DL (ref 40–60)
LDLC SERPL CALC-MCNC: 65 MG/DL (ref 0–100)
LDLC/HDLC SERPL: 1.3 {RATIO}
PROT SERPL-MCNC: 6.5 G/DL (ref 6–8.5)
TRIGL SERPL-MCNC: 70 MG/DL (ref 0–150)
VLDLC SERPL CALC-MCNC: 14 MG/DL (ref 5–40)

## 2025-02-25 ENCOUNTER — HOSPITAL ENCOUNTER (OUTPATIENT)
Dept: SLEEP MEDICINE | Facility: HOSPITAL | Age: 55
Discharge: HOME | End: 2025-02-25
Admitting: NURSE PRACTITIONER
Payer: COMMERCIAL

## 2025-02-25 DIAGNOSIS — R29.818 SUSPECTED SLEEP APNEA: ICD-10-CM

## 2025-02-25 DIAGNOSIS — H47.011 OPTIC NERVE ISCHEMIA, RIGHT: ICD-10-CM

## 2025-02-25 DIAGNOSIS — E66.3 OVERWEIGHT (BMI 25.0-29.9): ICD-10-CM

## 2025-02-25 DIAGNOSIS — R06.83 SNORING: ICD-10-CM

## 2025-02-25 PROCEDURE — G0399 HOME SLEEP TEST/TYPE 3 PORTA: HCPCS

## 2025-02-26 ENCOUNTER — OFFICE VISIT (OUTPATIENT)
Dept: INTERNAL MEDICINE | Facility: CLINIC | Age: 55
End: 2025-02-26
Payer: COMMERCIAL

## 2025-02-26 VITALS
WEIGHT: 162 LBS | BODY MASS INDEX: 28.7 KG/M2 | DIASTOLIC BLOOD PRESSURE: 80 MMHG | HEIGHT: 63 IN | SYSTOLIC BLOOD PRESSURE: 122 MMHG | OXYGEN SATURATION: 98 % | HEART RATE: 65 BPM

## 2025-02-26 DIAGNOSIS — Z00.00 HEALTH CARE MAINTENANCE: ICD-10-CM

## 2025-02-26 DIAGNOSIS — E78.5 HYPERLIPIDEMIA, UNSPECIFIED HYPERLIPIDEMIA TYPE: ICD-10-CM

## 2025-02-26 DIAGNOSIS — H47.011 OPTIC NERVE ISCHEMIA, RIGHT: Primary | ICD-10-CM

## 2025-02-26 PROCEDURE — 99214 OFFICE O/P EST MOD 30 MIN: CPT | Performed by: NURSE PRACTITIONER

## 2025-02-26 NOTE — PROGRESS NOTES
"Subjective   Carmen Matthews is a 54 y.o. female.      History of Present Illness   The patient is here today to F/U on lab work. Lipitor started recently.     Sleep med- just had VIDHYA sleep study at night, \"I'm sleeping better than I have been in a long time.\" Attributes to weight loss.   Down 17 lbs from October.     eye specialist on 1/3rd check up after cataract removal. BP had been up, saw ophthalmic specialist, 1/21st. BP was 152/106. Recently had MRI of the brain- to discuss results next week   The following portions of the patient's history were reviewed and updated as appropriate: allergies, current medications, past family history, past medical history, past social history, past surgical history and problem list.    Review of Systems   Constitutional:  Negative for chills, diaphoresis, fatigue and fever.   HENT:  Negative for congestion and sore throat.    Respiratory: Negative.  Negative for cough.    Cardiovascular: Negative.  Negative for chest pain.   Gastrointestinal:  Negative for abdominal pain, nausea and vomiting.   Musculoskeletal:  Negative for myalgias and neck pain.   Skin:  Negative for rash.   Neurological:  Negative for weakness and numbness.   Psychiatric/Behavioral: Negative.  Negative for dysphoric mood and suicidal ideas. The patient is not nervous/anxious.        Objective   Physical Exam  Constitutional:       Appearance: Normal appearance. She is well-developed.   Neck:      Thyroid: No thyromegaly.   Cardiovascular:      Rate and Rhythm: Normal rate and regular rhythm.      Heart sounds: Normal heart sounds.   Pulmonary:      Effort: Pulmonary effort is normal.      Breath sounds: Normal breath sounds.   Musculoskeletal:      Cervical back: Normal range of motion and neck supple.   Lymphadenopathy:      Cervical: No cervical adenopathy.   Skin:     General: Skin is warm and dry.   Neurological:      Mental Status: She is alert.   Psychiatric:         Behavior: Behavior normal.         " Thought Content: Thought content normal.         Judgment: Judgment normal.         Vitals:    02/26/25 0811   BP: 122/80   Pulse: 65   SpO2: 98%     Body mass index is 28.7 kg/m².      Current Outpatient Medications:     aspirin 81 MG chewable tablet, Chew 1 tablet Daily., Disp: , Rfl:     atorvastatin (Lipitor) 40 MG tablet, Take 1 tablet by mouth Daily., Disp: 90 tablet, Rfl: 2   Assessment & Plan   Diagnoses and all orders for this visit:    1. Optic nerve ischemia, right (Primary)  -     Ambulatory Referral to Cardiology  -     CBC & Differential; Future  -     Comprehensive Metabolic Panel; Future  -     Lipid Panel With LDL / HDL Ratio; Future  -     TSH Rfx On Abnormal To Free T4; Future    2. Hyperlipidemia, unspecified hyperlipidemia type  -     CBC & Differential; Future  -     Comprehensive Metabolic Panel; Future  -     Lipid Panel With LDL / HDL Ratio; Future  -     TSH Rfx On Abnormal To Free T4; Future    3. Health care maintenance  -     CBC & Differential; Future  -     Comprehensive Metabolic Panel; Future  -     Lipid Panel With LDL / HDL Ratio; Future  -     TSH Rfx On Abnormal To Free T4; Future        Orders Only on 02/19/2025   Component Date Value Ref Range Status    Total Cholesterol 02/20/2025 129  0 - 200 mg/dL Final    Comment: Cholesterol Reference Ranges  (U.S. Department of Health and Human Services ATP III  Classifications)  Desirable          <200 mg/dL  Borderline High    200-239 mg/dL  High Risk          >240 mg/dL  Triglyceride Reference Ranges  (U.S. Department of Health and Human Services ATP III  Classifications)  Normal           <150 mg/dL  Borderline High  150-199 mg/dL  High             200-499 mg/dL  Very High        >500 mg/dL  HDL Reference Ranges  (U.S. Department of Health and Human Services ATP III  Classifications)  Low     <40 mg/dl (major risk factor for CHD)  High    >60 mg/dl ('negative' risk factor for CHD)  LDL Reference Ranges  (U.S. Department of Health and  Human Services ATP III  Classifications)  Optimal          <100 mg/dL  Near Optimal     100-129 mg/dL  Borderline High  130-159 mg/dL  High             160-189 mg/dL  Very High        >189 mg/dL  LDL is calculated using the NIH LDL-C calculation.      Triglycerides 02/20/2025 70  0 - 150 mg/dL Final    HDL Cholesterol 02/20/2025 50  40 - 60 mg/dL Final    VLDL Cholesterol [place] 02/20/2025 14  5 - 40 mg/dL Final    LDL Chol Calc (NIH) 02/20/2025 65  0 - 100 mg/dL Final    LDL/HDL RATIO 02/20/2025 1.30   Final    Total Protein 02/20/2025 6.5  6.0 - 8.5 g/dL Final    Albumin 02/20/2025 4.4  3.5 - 5.2 g/dL Final    Total Bilirubin 02/20/2025 0.7  0.0 - 1.2 mg/dL Final    Bilirubin, Direct 02/20/2025 0.2  0.0 - 0.3 mg/dL Final    Alkaline Phosphatase 02/20/2025 76  39 - 117 U/L Final    AST (SGOT) 02/20/2025 16  1 - 32 U/L Final    ALT (SGPT) 02/20/2025 12  1 - 33 U/L Final           1. HPL/optic nerve ischemia- continue lipitor and ASA, send copy of MRI here, continue to follow with eye spec

## 2025-02-27 ENCOUNTER — TELEPHONE (OUTPATIENT)
Dept: SLEEP MEDICINE | Facility: HOSPITAL | Age: 55
End: 2025-02-27
Payer: COMMERCIAL

## 2025-02-27 DIAGNOSIS — G47.33 OSA (OBSTRUCTIVE SLEEP APNEA): Primary | ICD-10-CM

## 2025-02-27 DIAGNOSIS — R06.83 SNORING: ICD-10-CM

## 2025-03-06 ENCOUNTER — OFFICE VISIT (OUTPATIENT)
Age: 55
End: 2025-03-06
Payer: COMMERCIAL

## 2025-03-06 VITALS
HEART RATE: 61 BPM | RESPIRATION RATE: 18 BRPM | OXYGEN SATURATION: 98 % | HEIGHT: 62 IN | DIASTOLIC BLOOD PRESSURE: 72 MMHG | SYSTOLIC BLOOD PRESSURE: 116 MMHG | BODY MASS INDEX: 30.18 KG/M2 | WEIGHT: 164 LBS

## 2025-03-06 DIAGNOSIS — H47.011 NAION (NON-ARTERITIC ANTERIOR ISCHEMIC OPTIC NEUROPATHY), RIGHT EYE: Primary | ICD-10-CM

## 2025-03-06 DIAGNOSIS — Z82.49 FAMILY HISTORY OF CARDIOMYOPATHY: ICD-10-CM

## 2025-03-06 NOTE — PROGRESS NOTES
Subjective:     Encounter Date:03/06/25      Patient ID: Carmen Matthews is a 54 y.o. female.    Chief Complaint:  History of Present Illness    Dear Jojo,    I had the pleasure of seeing this patient in the office today for initial evaluation and consultation.  I appreciate that you sent him in to see us.  They come in today to be seen for ischemic optic neuropathy of the right eye.    Patient was recently seen by [facility].  Patient was diagnosed with Non-arteritic anterior ischemic optic neuropathy.  Recommendations for sleep study as well as an MRI to rule out inflammatory space-occupying lesions.  Blood pressure was also markedly elevated at the time of the diagnosis and 152/106.    Patient had an MRI performed.  She thinks that everything looked okay but she understood she was post to come in today to follow-up after the MRI.  Unfortunately I do not have the results of the MRIs yet.  Yet, the MRI was ordered by [facility] to rule out retrobulbar masses, tumors, inflammation, infection, and any vascular abnormality.    I reviewed the MRI that showed no acute intracranial process, specifically no evidence of recent infarct.  There was a suggestion of small vessel ischemia as well as bilateral cataract surgery changes.  No other abnormality was seen.    She has a family history of cardiomyopathy was felt most likely be a viral cardiomyopathy.  She did have an echocardiogram performed in 2021, reviewed in detail below.  No abnormality was seen.    She has recently been diagnosed with sleep apnea and is just started using CPAP as of last Friday.  So far she is doing fairly well with it.    She denies any chest pain, pressure, tightness, squeezing, or heartburn.  She has not experienced any feeling of palpitations, tachycardia or heart racing and no presyncope or syncope.  There have not been any problems with dizziness or lightheadedness.  There have not been any orthopnea or PND, and no problems  "with lower extremity edema.  She denies any shortness of breath at rest or with activity and has not had any wheezing.  She has not had any problems with unexplained nausea or vomiting. She has continued to perform daily activities of living without any specific problem or change in the level of activity.  She has not been recently hospitalized for any reason.    The following portions of the patient's history were reviewed and updated as appropriate: allergies, current medications, past family history, past medical history, past social history, past surgical history and problem list.      ECG 12 Lead    Date/Time: 3/6/2025 9:28 AM  Performed by: Edin Zendejas III, MD    Authorized by: Edin Zendejas III, MD  Comparison: compared with previous ECG   Similar to previous ECG  Rhythm: sinus rhythm  Rate: normal  Conduction: conduction normal  ST Segments: ST segments normal  T Waves: T waves normal  QRS axis: normal  Other: no other findings  Other findings: non-specific ST-T wave changes    Clinical impression: normal ECG           Objective:     Vitals:    03/06/25 0838   BP: 116/72   Pulse: 61   Resp: 18   SpO2: 98%   Weight: 74.4 kg (164 lb)   Height: 157.5 cm (62\")     Body mass index is 30 kg/m².      Vitals reviewed.   Constitutional:       General: Not in acute distress.     Appearance: Well-developed. Not diaphoretic.   Eyes:      General:         Right eye: No discharge.         Left eye: No discharge.      Conjunctiva/sclera: Conjunctivae normal.   HENT:      Head: Normocephalic and atraumatic.      Nose: Nose normal.   Neck:      Thyroid: No thyromegaly.      Trachea: No tracheal deviation.   Pulmonary:      Effort: Pulmonary effort is normal. No respiratory distress.      Breath sounds: Normal breath sounds. No stridor.   Chest:      Chest wall: Not tender to palpatation.   Cardiovascular:      Normal rate. Regular rhythm.      Murmurs: There is no murmur.      . No S3 gallop. No click. No rub. " "  Pulses:     Intact distal pulses.   Edema:     Peripheral edema absent.   Abdominal:      General: Bowel sounds are normal. There is no distension.      Palpations: Abdomen is soft. There is no abdominal mass.   Musculoskeletal: Normal range of motion.         General: No tenderness or deformity.      Cervical back: Normal range of motion and neck supple. Skin:     General: Skin is warm and dry.      Findings: No erythema or rash.   Neurological:      Mental Status: Alert.   Psychiatric:         Attention and Perception: Attention normal.       Data and records reviewed:     Lab Results   Component Value Date    GLUCOSE 79 10/21/2024    BUN 13 10/21/2024    CREATININE 1.00 10/21/2024     10/21/2024    K 4.2 10/21/2024     10/21/2024    CALCIUM 9.2 10/21/2024    PROTEINTOT 6.5 02/20/2025    ALBUMIN 4.4 02/20/2025    ALT 12 02/20/2025    AST 16 02/20/2025    ALKPHOS 76 02/20/2025    BILITOT 0.7 02/20/2025    GLOB 2.2 10/21/2024    AGRATIO 1.9 10/21/2024    BCR 13.0 10/21/2024    EGFR 66.7 10/21/2024     No results found for: \"CHOL\"  Lab Results   Component Value Date    TRIG 70 02/20/2025    TRIG 157 (H) 10/21/2024    TRIG 107 05/02/2024     Lab Results   Component Value Date    HDL 50 02/20/2025    HDL 64 (H) 10/21/2024    HDL 64 (H) 05/02/2024     Lab Results   Component Value Date    LDL 65 02/20/2025     (H) 10/21/2024     (H) 05/02/2024     Lab Results   Component Value Date    VLDL 14 02/20/2025    VLDL 28 10/21/2024    VLDL 19 05/02/2024     Lab Results   Component Value Date    LDLHDL 1.30 02/20/2025    LDLHDL 2.23 05/02/2024    LDLHDL 2.4 10/18/2023     CBC          10/21/2024    07:07   CBC   WBC 7.07    RBC 4.46    Hemoglobin 13.3    Hematocrit 40.0    MCV 89.7    MCH 29.8    MCHC 33.3    RDW 13.1    Platelets 297      No radiology results for the last 90 days.  Results for orders placed during the hospital encounter of 10/21/21    Adult Transthoracic Echo Complete W/ Cont if " Necessary Per Protocol    Interpretation Summary  · Calculated left ventricular EF = 57.8% Estimated left ventricular EF was in agreement with the calculated left ventricular EF.  · Left ventricular diastolic function was normal.  · Mild tricuspid valve regurgitation is present.  · Estimated right ventricular systolic pressure from tricuspid regurgitation is normal (<35 mmHg).          Assessment:          Diagnosis Plan   1. Optic nerve ischemia, right  ECG 12 Lead      2. Family history of cardiomyopathy  ECG 12 Lead      3. NAION (non-arteritic anterior ischemic optic neuropathy), right eye               Plan:       1.  Non-arteritic anterior ischemic optic neuropathy, right eye, referred here for evaluation after her recent MRI.  MRI does not demonstrate any CVA or other perfusion abnormality.  She does not meet guideline recommendations for any additional cardiac testing at this time.  2.  Family history of cardiomyopathy, most recent echocardiogram was normal.      Thank you very much for allowing us to participate in the care of this pleasant patient.  Please don't hesitate to call if I can be of assistance in any way.      Current Outpatient Medications:     aspirin 81 MG chewable tablet, Chew 1 tablet Daily., Disp: , Rfl:     atorvastatin (Lipitor) 40 MG tablet, Take 1 tablet by mouth Daily., Disp: 90 tablet, Rfl: 2         No follow-ups on file.

## 2025-04-28 ENCOUNTER — OFFICE VISIT (OUTPATIENT)
Facility: HOSPITAL | Age: 55
End: 2025-04-28
Payer: COMMERCIAL

## 2025-04-28 VITALS — HEIGHT: 62 IN | OXYGEN SATURATION: 96 % | HEART RATE: 64 BPM | BODY MASS INDEX: 30.91 KG/M2 | WEIGHT: 168 LBS

## 2025-04-28 DIAGNOSIS — E66.811 CLASS 1 OBESITY WITH BODY MASS INDEX (BMI) OF 30.0 TO 30.9 IN ADULT, UNSPECIFIED OBESITY TYPE, UNSPECIFIED WHETHER SERIOUS COMORBIDITY PRESENT: ICD-10-CM

## 2025-04-28 DIAGNOSIS — G47.33 MODERATE OBSTRUCTIVE SLEEP APNEA: Primary | ICD-10-CM

## 2025-04-28 PROCEDURE — 99213 OFFICE O/P EST LOW 20 MIN: CPT | Performed by: NURSE PRACTITIONER

## 2025-04-28 PROCEDURE — G0463 HOSPITAL OUTPT CLINIC VISIT: HCPCS

## 2025-04-28 NOTE — PROGRESS NOTES
"  [address] [facility]   [address] [place], [place] [address]  Phone [phone]  Fax [phone]        SLEEP CLINIC FOLLOW-UP PROGRESS NOTE    Carmen Matthews  [id number]   1970  54 y.o.  female      PCP: Jojo Jeter APRN    DATE OF VISIT: 4/28/2025          CHIEF COMPLAINT: Moderate obstructive sleep apnea    HPI:  This is a 54 y.o. year old patient who presents to the clinic today for the management of obstructive sleep apnea.  Patient had a(n) home sleep study 2/2025 showing moderate obstructive sleep apnea with AHI of 15/hr using 4% desaturation for hypopneas.  This patient is using positive airway pressure therapy with auto CPAP.   Patient's sleep apnea has improved with this therapy with residual AHI 0.8/hr.   Average usage is 4 hours 57 minutes per night on nights used.   Patient tolerating device well and improved with treatment.  She has been able to increase her usage over the past few nights.  She is tolerating well.  Denies issues with pressures.  Using nasal mask, not opening mouth at night.          MEDICATIONS: reviewed     ALLERGIES:  Patient has no known allergies.    SOCIAL HISTORY (habits pertaining to sleep medicine):  Tobacco use: No   Alcohol use: 0 per week  Caffeine use: 1     REVIEW OF SYSTEMS:   Pertinent positive symptoms are:  [place] Sleepiness Scale :Total score: 4           PHYSICAL EXAMINATION:  CONSTITUTIONAL:  Vitals:    04/28/25 0700   Pulse: 64   SpO2: 96%   Weight: 76.2 kg (168 lb)   Height: 157.5 cm (62\")    Body mass index is 30.73 kg/m².   HEAD: atraumatic, normocephalic  RESP SYSTEM: not in respiratory distress, breathing unlabored  CARDIOVASULAR: normal rate, no edema noted   NEURO: Alert and oriented x 3, mood and affect appeared appropriate      DATA REVIEWED:  The PAP compliance summary downloaded on 4/27/2025 has been reviewed independently by me and discussed with the patient.   Compliance: 100%  More than 4 hr use: 97%  Average use of the device: 4 hours 57 minutes " per night  Residual AHI: 0.8/hr (goal < 5.0 /hr)  Device: ResMed air sense 11  Mask type: Nasal  DME: [employer]          ASSESSMENT AND PLAN:  Obstructive Sleep Apnea: sleep apnea has improved with the device and treatment.  Patient has excellent compliance with the device for treatment of sleep apnea.  I have personally reviewed the smart card download and discussed the download data with the patient and encouarged continued use of the device.  The residual AHI is acceptable. The device is benefiting the patient and the device is medically necessary.  Recommend patient get supplies from the DME company, change them on a regular basis, and clean as directed.  A prescription for supplies has been sent to the DME company.  Recommend continued usage of PAP device, most insurances require minimum usage of 4 hours per night at least 70% of the time, would recommend using all night every night if possible for optimum health.  Recommend prioritizing sleep to aid in overall health.  Do not drive or operate heavy machinery or do activities that require high concentration if feeling tired or drowsy.  Obesity: Body mass index is 30.73 kg/m².. Patients who are overweight or obese are at increased risk of sleep apnea/ sleep disordered breathing. Weight reduction and healthy lifestyle are encouraged in overweight/ obese patients as part of a comprehensive approach to sleep apnea treatment.        Patient will follow-up in 6 months or follow-up sooner for any issues or concerns.  Patient's questions were answered.          Thank you for allowing me to participate in the care of this patient.     Alysa Carter DNP, APRN  [facility]

## 2025-06-07 NOTE — TELEPHONE ENCOUNTER
LABS ORDERED.        ----- Message from MICHAEL Mckeon sent at 8/20/2020  5:22 PM EDT -----  CBC, CMP, LP, TSH, Vit D  ----- Message -----  From: Hermelinda Pascual  Sent: 8/20/2020   1:08 PM EDT  To: MICHAEL Mckeon    PLEASE ADVISE ON LABS.  THIS LABS ARE FOR Monday.       0

## 2025-07-28 ENCOUNTER — APPOINTMENT (OUTPATIENT)
Dept: WOMENS IMAGING | Facility: HOSPITAL | Age: 55
End: 2025-07-28
Payer: COMMERCIAL

## 2025-07-28 PROCEDURE — 77067 SCR MAMMO BI INCL CAD: CPT | Performed by: RADIOLOGY

## 2025-07-28 PROCEDURE — 77063 BREAST TOMOSYNTHESIS BI: CPT | Performed by: RADIOLOGY

## 2025-08-20 DIAGNOSIS — H47.011 OPTIC NERVE ISCHEMIA, RIGHT: ICD-10-CM

## 2025-08-20 DIAGNOSIS — Z00.00 HEALTH CARE MAINTENANCE: ICD-10-CM

## 2025-08-20 DIAGNOSIS — E78.5 HYPERLIPIDEMIA, UNSPECIFIED HYPERLIPIDEMIA TYPE: ICD-10-CM

## 2025-08-21 LAB
ALBUMIN SERPL-MCNC: 4.4 G/DL (ref 3.5–5.2)
ALBUMIN/GLOB SERPL: 2 G/DL
ALP SERPL-CCNC: 73 U/L (ref 39–117)
ALT SERPL-CCNC: 18 U/L (ref 1–33)
AST SERPL-CCNC: 19 U/L (ref 1–32)
BASOPHILS # BLD AUTO: 0.06 10*3/MM3 (ref 0–0.2)
BASOPHILS NFR BLD AUTO: 1.1 % (ref 0–1.5)
BILIRUB SERPL-MCNC: 0.6 MG/DL (ref 0–1.2)
BUN SERPL-MCNC: 10 MG/DL (ref 6–20)
BUN/CREAT SERPL: 12.5 (ref 7–25)
CALCIUM SERPL-MCNC: 9.1 MG/DL (ref 8.6–10.5)
CHLORIDE SERPL-SCNC: 102 MMOL/L (ref 98–107)
CHOLEST SERPL-MCNC: 146 MG/DL (ref 0–200)
CO2 SERPL-SCNC: 27.4 MMOL/L (ref 22–29)
CREAT SERPL-MCNC: 0.8 MG/DL (ref 0.57–1)
EGFRCR SERPLBLD CKD-EPI 2021: 86.6 ML/MIN/1.73
EOSINOPHIL # BLD AUTO: 0.3 10*3/MM3 (ref 0–0.4)
EOSINOPHIL NFR BLD AUTO: 5.6 % (ref 0.3–6.2)
ERYTHROCYTE [DISTWIDTH] IN BLOOD BY AUTOMATED COUNT: 13.1 % (ref 12.3–15.4)
GLOBULIN SER CALC-MCNC: 2.2 GM/DL
GLUCOSE SERPL-MCNC: 95 MG/DL (ref 65–99)
HCT VFR BLD AUTO: 41.5 % (ref 34–46.6)
HDLC SERPL-MCNC: 57 MG/DL (ref 40–60)
HGB BLD-MCNC: 13.1 G/DL (ref 12–15.9)
IMM GRANULOCYTES # BLD AUTO: 0.01 10*3/MM3 (ref 0–0.05)
IMM GRANULOCYTES NFR BLD AUTO: 0.2 % (ref 0–0.5)
LDLC SERPL CALC-MCNC: 70 MG/DL (ref 0–100)
LDLC/HDLC SERPL: 1.19 {RATIO}
LYMPHOCYTES # BLD AUTO: 1.81 10*3/MM3 (ref 0.7–3.1)
LYMPHOCYTES NFR BLD AUTO: 33.8 % (ref 19.6–45.3)
MCH RBC QN AUTO: 29 PG (ref 26.6–33)
MCHC RBC AUTO-ENTMCNC: 31.6 G/DL (ref 31.5–35.7)
MCV RBC AUTO: 91.8 FL (ref 79–97)
MONOCYTES # BLD AUTO: 0.64 10*3/MM3 (ref 0.1–0.9)
MONOCYTES NFR BLD AUTO: 12 % (ref 5–12)
NEUTROPHILS # BLD AUTO: 2.53 10*3/MM3 (ref 1.7–7)
NEUTROPHILS NFR BLD AUTO: 47.3 % (ref 42.7–76)
NRBC BLD AUTO-RTO: 0 /100 WBC (ref 0–0.2)
PLATELET # BLD AUTO: 271 10*3/MM3 (ref 140–450)
POTASSIUM SERPL-SCNC: 4 MMOL/L (ref 3.5–5.2)
PROT SERPL-MCNC: 6.6 G/DL (ref 6–8.5)
RBC # BLD AUTO: 4.52 10*6/MM3 (ref 3.77–5.28)
SODIUM SERPL-SCNC: 142 MMOL/L (ref 136–145)
TRIGL SERPL-MCNC: 107 MG/DL (ref 0–150)
TSH SERPL DL<=0.005 MIU/L-ACNC: 2.47 UIU/ML (ref 0.27–4.2)
VLDLC SERPL CALC-MCNC: 19 MG/DL (ref 5–40)
WBC # BLD AUTO: 5.35 10*3/MM3 (ref 3.4–10.8)

## 2025-08-28 ENCOUNTER — OFFICE VISIT (OUTPATIENT)
Dept: INTERNAL MEDICINE | Facility: CLINIC | Age: 55
End: 2025-08-28
Payer: COMMERCIAL

## 2025-08-28 VITALS
SYSTOLIC BLOOD PRESSURE: 124 MMHG | OXYGEN SATURATION: 98 % | DIASTOLIC BLOOD PRESSURE: 82 MMHG | HEART RATE: 63 BPM | HEIGHT: 62 IN | BODY MASS INDEX: 33.68 KG/M2 | WEIGHT: 183 LBS

## 2025-08-28 DIAGNOSIS — H47.011 NAION (NON-ARTERITIC ANTERIOR ISCHEMIC OPTIC NEUROPATHY), RIGHT EYE: ICD-10-CM

## 2025-08-28 DIAGNOSIS — E78.5 HYPERLIPIDEMIA, UNSPECIFIED HYPERLIPIDEMIA TYPE: Primary | ICD-10-CM

## (undated) DEVICE — SENSR O2 OXIMAX FNGR A/ 18IN NONSTR

## (undated) DEVICE — THE TORRENT IRRIGATION SCOPE CONNECTOR IS USED WITH THE TORRENT IRRIGATION TUBING TO PROVIDE IRRIGATION FLUIDS SUCH AS STERILE WATER DURING GASTROINTESTINAL ENDOSCOPIC PROCEDURES WHEN USED IN CONJUNCTION WITH AN IRRIGATION PUMP (OR ELECTROSURGICAL UNIT).: Brand: TORRENT

## (undated) DEVICE — LN SMPL CO2 SHTRM SD STREAM W/M LUER

## (undated) DEVICE — CANN O2 ETCO2 FITS ALL CONN CO2 SMPL A/ 7IN DISP LF

## (undated) DEVICE — TUBING, SUCTION, 1/4" X 10', STRAIGHT: Brand: MEDLINE

## (undated) DEVICE — CANN NASL CO2 TRULINK W/O2 A/

## (undated) DEVICE — KT ORCA ORCAPOD DISP STRL

## (undated) DEVICE — SINGLE-USE BIOPSY FORCEPS: Brand: RADIAL JAW 4

## (undated) DEVICE — ADAPT CLN BIOGUARD AIR/H2O DISP

## (undated) DEVICE — KT VLV BIOGUARD SXN BIOP AIR/H20 CONN 4PC DISP